# Patient Record
Sex: MALE | HISPANIC OR LATINO | Employment: FULL TIME | URBAN - METROPOLITAN AREA
[De-identification: names, ages, dates, MRNs, and addresses within clinical notes are randomized per-mention and may not be internally consistent; named-entity substitution may affect disease eponyms.]

---

## 2017-01-20 ENCOUNTER — OFFICE VISIT (OUTPATIENT)
Dept: PHYSICAL MEDICINE AND REHAB | Facility: MEDICAL CENTER | Age: 32
End: 2017-01-20
Payer: COMMERCIAL

## 2017-01-20 VITALS
WEIGHT: 244 LBS | OXYGEN SATURATION: 98 % | HEIGHT: 65 IN | TEMPERATURE: 97.2 F | BODY MASS INDEX: 40.65 KG/M2 | SYSTOLIC BLOOD PRESSURE: 118 MMHG | HEART RATE: 69 BPM | DIASTOLIC BLOOD PRESSURE: 80 MMHG

## 2017-01-20 DIAGNOSIS — M79.641 PAIN IN BOTH HANDS: ICD-10-CM

## 2017-01-20 DIAGNOSIS — M25.532 PAIN IN BOTH WRISTS: ICD-10-CM

## 2017-01-20 DIAGNOSIS — M25.50 ARTHRALGIA, UNSPECIFIED JOINT: ICD-10-CM

## 2017-01-20 DIAGNOSIS — Z79.899 CONTROLLED SUBSTANCE AGREEMENT SIGNED: ICD-10-CM

## 2017-01-20 DIAGNOSIS — M62.838 MUSCLE SPASM: ICD-10-CM

## 2017-01-20 DIAGNOSIS — M06.9 RHEUMATOID ARTHRITIS, INVOLVING UNSPECIFIED SITE, UNSPECIFIED RHEUMATOID FACTOR PRESENCE: ICD-10-CM

## 2017-01-20 DIAGNOSIS — M79.642 PAIN IN BOTH HANDS: ICD-10-CM

## 2017-01-20 DIAGNOSIS — M25.531 PAIN IN BOTH WRISTS: ICD-10-CM

## 2017-01-20 DIAGNOSIS — M79.2 NERVE PAIN: ICD-10-CM

## 2017-01-20 DIAGNOSIS — G56.00 CARPAL TUNNEL SYNDROME, UNSPECIFIED LATERALITY: ICD-10-CM

## 2017-01-20 DIAGNOSIS — M54.9 SPINAL PAIN: ICD-10-CM

## 2017-01-20 DIAGNOSIS — G56.03 BILATERAL CARPAL TUNNEL SYNDROME: ICD-10-CM

## 2017-01-20 DIAGNOSIS — R79.89 LOW TESTOSTERONE LEVEL IN MALE: ICD-10-CM

## 2017-01-20 DIAGNOSIS — M25.552 LEFT HIP PAIN: ICD-10-CM

## 2017-01-20 PROCEDURE — 99214 OFFICE O/P EST MOD 30 MIN: CPT | Performed by: PHYSICAL MEDICINE & REHABILITATION

## 2017-01-20 RX ORDER — MORPHINE SULFATE 15 MG/1
TABLET ORAL
Qty: 120 TAB | Refills: 0 | Status: SHIPPED | OUTPATIENT
Start: 2017-01-20 | End: 2017-01-20 | Stop reason: SDUPTHER

## 2017-01-20 RX ORDER — DIAZEPAM 5 MG/1
TABLET ORAL
Refills: 0 | COMMUNITY
Start: 2016-12-21 | End: 2017-01-20

## 2017-01-20 RX ORDER — GABAPENTIN 300 MG/1
CAPSULE ORAL
Qty: 180 CAP | Refills: 5 | Status: SHIPPED | OUTPATIENT
Start: 2017-01-20

## 2017-01-20 RX ORDER — MORPHINE SULFATE 15 MG/1
TABLET ORAL
Qty: 120 TAB | Refills: 0 | Status: SHIPPED | OUTPATIENT
Start: 2017-01-20 | End: 2021-04-28

## 2017-01-20 RX ORDER — CYCLOBENZAPRINE HCL 10 MG
TABLET ORAL
Qty: 90 TAB | Refills: 5 | Status: SHIPPED | OUTPATIENT
Start: 2017-01-20 | End: 2018-07-19 | Stop reason: SDUPTHER

## 2017-01-20 NOTE — MR AVS SNAPSHOT
"Cooper Beatty   2017 8:20 AM   Office Visit   MRN: [id number]    Department:  [facility]   Dept Phone:  [phone]    Description:  Male : 1985   Provider:  Victor Hugo Ma M.D.           Reason for Visit     Follow-Up           Allergies as of 2017     Allergen Noted Reactions    Nkda [No Known Drug Allergy] 2008         You were diagnosed with     Controlled substance agreement signed   [063172]       Spinal pain   [788636]       Rheumatoid arthritis, involving unspecified site, unspecified rheumatoid factor presence (CMS-[facility])   [3496775]       Nerve pain   [615826]       Muscle spasm   [208925]       Low testosterone level in male   [8772011]       Arthralgia, unspecified joint   [7221357]       Carpal tunnel syndrome, unspecified laterality   [399304]       Pain in both hands   [5057257]       Bilateral carpal tunnel syndrome   [941320]       Pain in both wrists   [693134]       Left hip pain   [579949]         Vital Signs     Blood Pressure Pulse Temperature Height Weight Body Mass Index    118/80 mmHg 69 36.2 °C (97.2 °F) 1.651 m (5' 5\") 110.678 kg (244 lb) 40.60 kg/m2    Oxygen Saturation Smoking Status                98% Never Smoker           Basic Information     Date Of Birth Sex Race Ethnicity Preferred Language    1985 Male  or   Origin (Irish,Belarusian,Serbian,Mosotho, etc) English      Your appointments     2017  9:15 AM   Follow Up Visit with Victor Hugo Ma M.D.   [facility] PHYSIAT[facility] (--)    [address] [id number] [phone]           You will be receiving a confirmation call a few days before your appointment from our automated call confirmation system.              Problem List              ICD-10-CM Priority Class Noted - Resolved    Rheumatoid arthritis (CMS-[facility]) M06.9   11/3/2009 - Present    Wrist pain M25.539   10/30/2013 - Present    Hand pain M79.643   10/30/2013 - Present "    Carpal tunnel syndrome G56.00   10/30/2013 - Present    Hip pain M25.559   10/30/2013 - Present    Shoulder pain M25.519   10/30/2013 - Present    Low back pain M54.5   10/30/2013 - Present    Chronic use of opiate drugs therapeutic purposes Z79.899   5/1/2015 - Present    Elevated liver enzymes R74.8   5/1/2015 - Present    Positive QuantiFERON-TB Gold test R76.12   5/1/2015 - Present    Vitamin D deficiency E55.9   5/1/2015 - Present    Controlled substance agreement signed Z79.899   2/18/2016 - Present      Health Maintenance        Date Due Completion Dates    IMM DTaP/Tdap/Td Vaccine (1 - Tdap) 5/24/2006 ---    IMM INFLUENZA (1) 9/1/2016 ---            Current Immunizations     No immunizations on file.      Below and/or attached are the medications your provider expects you to take. Review all of your home medications and newly ordered medications with your provider and/or pharmacist. Follow medication instructions as directed by your provider and/or pharmacist. Please keep your medication list with you and share with your provider. Update the information when medications are discontinued, doses are changed, or new medications (including over-the-counter products) are added; and carry medication information at all times in the event of emergency situations     Allergies:  NKDA - (reactions not documented)               Medications  Valid as of: January 20, 2017 -  9:41 AM    Generic Name Brand Name Tablet Size Instructions for use    Cyclobenzaprine HCl (Tab) FLEXERIL 10 MG Take 1/2  to 1 tablet by mouth three per day as needed for muscle spasm.        Gabapentin (Cap) NEURONTIN 300 MG Take 1 to 2 tablets by mouth every 8 hours as needed for nerve pain  Indications: Nerve Pain        Morphine Sulfate (Tab) MS IR 15 MG Take 1 tablet by mouth every 6  hours as needed for pain.        .                 Medicines prescribed today were sent to:     [facility] [place] [facility] [address]    [address]  [place] [address]    Phone: [phone] Fax: [phone]    Open 24 Hours?: No      Medication refill instructions:       If your prescription bottle indicates you have medication refills left, it is not necessary to call your provider’s office. Please contact your pharmacy and they will refill your medication.    If your prescription bottle indicates you do not have any refills left, you may request refills at any time through one of the following ways: The online [employer] [facility] system (except Urgent Care), by calling your provider’s office, or by asking your pharmacy to contact your provider’s office with a refill request. Medication refills are processed only during regular business hours and may not be available until the next business day. Your provider may request additional information or to have a follow-up visit with you prior to refilling your medication.   *Please Note: Medication refills are assigned a new Rx number when refilled electronically. Your pharmacy may indicate that no refills were authorized even though a new prescription for the same medication is available at the pharmacy. Please request the medicine by name with the pharmacy before contacting your provider for a refill.        Your To Do List     Future Labs/Procedures Complete By Expires    DS-BONE DENSITY STUDY (DEXA)  As directed 1/20/2018    DX-HIP-COMPLETE - UNILATERAL 2+ LEFT  As directed 1/20/2018      Referral     A referral request has been sent to our patient care coordination department. Please allow 3-5 business days for us to process this request and contact you either by phone or mail. If you do not hear from us by the 5th business day, please call us at [phone].           [employer] Access Code: [email or web address]  Expires: 1/30/2017  8:13 AM    Tu [employer]et  A secure, online tool to manage your health information     [employer]’s Tu Closet Mi Closet® is a secure, online tool that connects you to your personalized health information  from the privacy of your home -- day or night - making it very easy for you to manage your healthcare. Once the activation process is completed, you can even access your medical information using the Case Commons jasen, which is available for free in the Apple Jasen store or Google Play store.     Case Commons provides the following levels of access (as shown below):   My Chart Features   [facility] Doctor [facility]  Specialists [facility]  Urgent  Care Non-[facility]  Primary Care  Doctor   Email your healthcare team securely and privately 24/7 X X X    Manage appointments: schedule your next appointment; view details of past/upcoming appointments X      Request prescription refills. X      View recent personal medical records, including lab and immunizations X X X X   View health record, including health history, allergies, medications X X X X   Read reports about your outpatient visits, procedures, consult and ER notes X X X X   See your discharge summary, which is a recap of your hospital and/or ER visit that includes your diagnosis, lab results, and care plan. X X       How to register for Case Commons:  1. Go to  [email or web address].  2. Click on the Sign Up Now box, which takes you to the New Member Sign Up page. You will need to provide the following information:  a. Enter your Case Commons Access Code exactly as it appears at the top of this page. (You will not need to use this code after you’ve completed the sign-up process. If you do not sign up before the expiration date, you must request a new code.)   b. Enter your date of birth.   c. Enter your home email address.   d. Click Submit, and follow the next screen’s instructions.  3. Create a Case Commons ID. This will be your Case Commons login ID and cannot be changed, so think of one that is secure and easy to remember.  4. Create a Case Commons password. You can change your password at any time.  5. Enter your Password Reset Question and Answer. This can be used at a later time if you  forget your password.   6. Enter your e-mail address. This allows you to receive e-mail notifications when new information is available in [employer].  7. Click Sign Up. You can now view your health information.    For assistance activating your [employer] account, call [phone]
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
## 2017-11-22 ENCOUNTER — NON-PROVIDER VISIT (OUTPATIENT)
Dept: OCCUPATIONAL MEDICINE | Facility: CLINIC | Age: 32
End: 2017-11-22

## 2017-11-22 DIAGNOSIS — Z02.1 DRUG TESTING, PRE-EMPLOYMENT: ICD-10-CM

## 2017-11-22 DIAGNOSIS — Z02.1 PRE-EMPLOYMENT DRUG SCREENING: ICD-10-CM

## 2017-11-22 LAB
AMP AMPHETAMINE: NORMAL
COC COCAINE: NORMAL
INT CON NEG: NORMAL
INT CON POS: NORMAL
MET METHAMPHETAMINES: NORMAL
OPI OPIATES: NORMAL
PCP PHENCYCLIDINE: NORMAL
POC DRUG COMMENT 753798-OCCUPATIONAL HEALTH: NEGATIVE
THC: NORMAL

## 2017-11-22 PROCEDURE — 8911 PR MRO FEE: Performed by: INTERNAL MEDICINE

## 2017-11-22 PROCEDURE — 80305 DRUG TEST PRSMV DIR OPT OBS: CPT | Performed by: PREVENTIVE MEDICINE

## 2018-02-09 ENCOUNTER — NON-PROVIDER VISIT (OUTPATIENT)
Dept: URGENT CARE | Facility: CLINIC | Age: 33
End: 2018-02-09

## 2018-02-09 DIAGNOSIS — Z02.1 PRE-EMPLOYMENT DRUG SCREENING: ICD-10-CM

## 2018-02-09 LAB
AMP AMPHETAMINE: NORMAL
COC COCAINE: NORMAL
INT CON NEG: NORMAL
INT CON POS: NORMAL
MET METHAMPHETAMINES: NORMAL
OPI OPIATES: NORMAL
PCP PHENCYCLIDINE: NORMAL
POC DRUG COMMENT 753798-OCCUPATIONAL HEALTH: NORMAL
THC: NORMAL

## 2018-02-09 PROCEDURE — 80305 DRUG TEST PRSMV DIR OPT OBS: CPT | Performed by: NURSE PRACTITIONER

## 2018-02-14 ENCOUNTER — NON-PROVIDER VISIT (OUTPATIENT)
Dept: OCCUPATIONAL MEDICINE | Facility: CLINIC | Age: 33
End: 2018-02-14

## 2018-02-14 DIAGNOSIS — Z02.83 ENCOUNTER FOR DRUG SCREENING: ICD-10-CM

## 2018-02-14 PROCEDURE — 8911 PR MRO FEE: Performed by: INTERNAL MEDICINE

## 2018-07-02 ENCOUNTER — HOSPITAL ENCOUNTER (OUTPATIENT)
Dept: RADIOLOGY | Facility: MEDICAL CENTER | Age: 33
End: 2018-07-02
Attending: INTERNAL MEDICINE
Payer: COMMERCIAL

## 2018-07-02 DIAGNOSIS — M06.9 RHEUMATOID ARTHRITIS OF WRIST, UNSPECIFIED LATERALITY, UNSPECIFIED RHEUMATOID FACTOR PRESENCE: ICD-10-CM

## 2018-07-02 PROCEDURE — 73110 X-RAY EXAM OF WRIST: CPT | Mod: LT

## 2018-07-19 ENCOUNTER — OFFICE VISIT (OUTPATIENT)
Dept: MEDICAL GROUP | Age: 33
End: 2018-07-19
Payer: COMMERCIAL

## 2018-07-19 ENCOUNTER — HOSPITAL ENCOUNTER (OUTPATIENT)
Dept: RADIOLOGY | Facility: MEDICAL CENTER | Age: 33
End: 2018-07-19
Attending: PHYSICIAN ASSISTANT
Payer: COMMERCIAL

## 2018-07-19 ENCOUNTER — HOSPITAL ENCOUNTER (OUTPATIENT)
Dept: LAB | Facility: MEDICAL CENTER | Age: 33
End: 2018-07-19
Attending: PHYSICIAN ASSISTANT
Payer: COMMERCIAL

## 2018-07-19 VITALS
WEIGHT: 236 LBS | HEART RATE: 67 BPM | BODY MASS INDEX: 41.82 KG/M2 | TEMPERATURE: 97.5 F | OXYGEN SATURATION: 97 % | HEIGHT: 63 IN | SYSTOLIC BLOOD PRESSURE: 98 MMHG | DIASTOLIC BLOOD PRESSURE: 64 MMHG

## 2018-07-19 DIAGNOSIS — R74.8 ELEVATED LIVER ENZYMES: ICD-10-CM

## 2018-07-19 DIAGNOSIS — Z23 NEED FOR VACCINATION: ICD-10-CM
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
## 2018-07-19 DIAGNOSIS — Z79.891 CHRONIC USE OF OPIATE DRUGS THERAPEUTIC PURPOSES: ICD-10-CM

## 2018-07-19 DIAGNOSIS — M06.9 RHEUMATOID ARTHRITIS INVOLVING MULTIPLE SITES, UNSPECIFIED RHEUMATOID FACTOR PRESENCE: ICD-10-CM

## 2018-07-19 DIAGNOSIS — M62.838 MUSCLE SPASM: ICD-10-CM

## 2018-07-19 DIAGNOSIS — E66.01 MORBID OBESITY WITH BMI OF 40.0-44.9, ADULT (HCC): ICD-10-CM

## 2018-07-19 LAB
ALBUMIN SERPL BCP-MCNC: 4.2 G/DL (ref 3.2–4.9)
ALBUMIN/GLOB SERPL: 1.6 G/DL
ALP SERPL-CCNC: 70 U/L (ref 30–99)
ALT SERPL-CCNC: 50 U/L (ref 2–50)
ANION GAP SERPL CALC-SCNC: 6 MMOL/L (ref 0–11.9)
AST SERPL-CCNC: 25 U/L (ref 12–45)
BASOPHILS # BLD AUTO: 0.6 % (ref 0–1.8)
BASOPHILS # BLD: 0.05 K/UL (ref 0–0.12)
BILIRUB SERPL-MCNC: 0.6 MG/DL (ref 0.1–1.5)
BUN SERPL-MCNC: 14 MG/DL (ref 8–22)
CALCIUM SERPL-MCNC: 9.7 MG/DL (ref 8.5–10.5)
CHLORIDE SERPL-SCNC: 104 MMOL/L (ref 96–112)
CHOLEST SERPL-MCNC: 123 MG/DL (ref 100–199)
CO2 SERPL-SCNC: 29 MMOL/L (ref 20–33)
CREAT SERPL-MCNC: 0.81 MG/DL (ref 0.5–1.4)
EOSINOPHIL # BLD AUTO: 0.15 K/UL (ref 0–0.51)
EOSINOPHIL NFR BLD: 1.9 % (ref 0–6.9)
ERYTHROCYTE [DISTWIDTH] IN BLOOD BY AUTOMATED COUNT: 39.8 FL (ref 35.9–50)
GLOBULIN SER CALC-MCNC: 2.6 G/DL (ref 1.9–3.5)
GLUCOSE SERPL-MCNC: 92 MG/DL (ref 65–99)
HCT VFR BLD AUTO: 46.4 % (ref 42–52)
HDLC SERPL-MCNC: 51 MG/DL
HGB BLD-MCNC: 15.5 G/DL (ref 14–18)
IMM GRANULOCYTES # BLD AUTO: 0.01 K/UL (ref 0–0.11)
IMM GRANULOCYTES NFR BLD AUTO: 0.1 % (ref 0–0.9)
LDLC SERPL CALC-MCNC: 57 MG/DL
LYMPHOCYTES # BLD AUTO: 2.54 K/UL (ref 1–4.8)
LYMPHOCYTES NFR BLD: 32.7 % (ref 22–41)
MCH RBC QN AUTO: 27.8 PG (ref 27–33)
MCHC RBC AUTO-ENTMCNC: 33.4 G/DL (ref 33.7–35.3)
MCV RBC AUTO: 83.3 FL (ref 81.4–97.8)
MONOCYTES # BLD AUTO: 0.66 K/UL (ref 0–0.85)
MONOCYTES NFR BLD AUTO: 8.5 % (ref 0–13.4)
NEUTROPHILS # BLD AUTO: 4.35 K/UL (ref 1.82–7.42)
NEUTROPHILS NFR BLD: 56.2 % (ref 44–72)
NRBC # BLD AUTO: 0 K/UL
NRBC BLD-RTO: 0 /100 WBC
PLATELET # BLD AUTO: 214 K/UL (ref 164–446)
PMV BLD AUTO: 9.9 FL (ref 9–12.9)
POTASSIUM SERPL-SCNC: 4 MMOL/L (ref 3.6–5.5)
PROT SERPL-MCNC: 6.8 G/DL (ref 6–8.2)
RBC # BLD AUTO: 5.57 M/UL (ref 4.7–6.1)
SODIUM SERPL-SCNC: 139 MMOL/L (ref 135–145)
TRIGL SERPL-MCNC: 73 MG/DL (ref 0–149)
WBC # BLD AUTO: 7.8 K/UL (ref 4.8–10.8)

## 2018-07-19 PROCEDURE — 85025 COMPLETE CBC W/AUTO DIFF WBC: CPT

## 2018-07-19 PROCEDURE — 99204 OFFICE O/P NEW MOD 45 MIN: CPT | Mod: 25 | Performed by: PHYSICIAN ASSISTANT

## 2018-07-19 PROCEDURE — 90471 IMMUNIZATION ADMIN: CPT | Performed by: PHYSICIAN ASSISTANT

## 2018-07-19 PROCEDURE — 72100 X-RAY EXAM L-S SPINE 2/3 VWS: CPT

## 2018-07-19 PROCEDURE — 90715 TDAP VACCINE 7 YRS/> IM: CPT | Performed by: PHYSICIAN ASSISTANT

## 2018-07-19 PROCEDURE — 80053 COMPREHEN METABOLIC PANEL: CPT

## 2018-07-19 PROCEDURE — 80061 LIPID PANEL: CPT

## 2018-07-19 PROCEDURE — 36415 COLL VENOUS BLD VENIPUNCTURE: CPT

## 2018-07-19 RX ORDER — HYDROCODONE BITARTRATE AND ACETAMINOPHEN 10; 325 MG/1; MG/1
1-2 TABLET ORAL EVERY 6 HOURS PRN
COMMUNITY
End: 2021-04-28

## 2018-07-19 RX ORDER — CYCLOBENZAPRINE HCL 5 MG
5 TABLET ORAL
Qty: 30 TAB | Refills: 0 | Status: SHIPPED
Start: 2018-07-19 | End: 2020-05-19

## 2018-07-19 ASSESSMENT — PATIENT HEALTH QUESTIONNAIRE - PHQ9: CLINICAL INTERPRETATION OF PHQ2 SCORE: 0

## 2018-07-19 NOTE — ASSESSMENT & PLAN NOTE
Patient sees Dr. Medrano at [facility] for controlled substances and ongoing pain procedures.  He is not requesting any additional medications from me today.  ORT, UDS, and CS agreement are not indicated.
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
## 2018-07-19 NOTE — ASSESSMENT & PLAN NOTE
This is an ongoing problem. The patient reports that he does not exercise regularly due to working 60-70 hours a week. He is interested in losing weight, but not sure how to accomplish this. His normal diet is as follows:    Breakfast: coffee and bagel  Lunch: subway or oatmeal  Dinner: small cheese burger, other small fast food.    He is frustrated because he does not eat a lot. He does not cook or eat at home because he works so much. His wife cooks, but he is rarely home for these meals. He is not a picky eater, and is willing to try most things. He is interested in learning more about nutrition, and would like to consult with our Health Improvement Program for this. In the meantime, he will work on integrating more vegetables and protein into his diet.      Takes prednisone for RA flares yearly, which caused most of his initial weight gain and has not been able to get the weight back off.

## 2018-07-19 NOTE — LETTER
[facility]  Beverly Blair P.A.-C.  [address] [place] [address]  Fax: [phone]   Authorization for Release/Disclosure of   Protected Health Information   Name: COOPER BERNAL : 1985 SSN: [id number]   Address: [address] [place] [address] Phone:    [phone] (home)    I authorize the entity listed below to release/disclose the PHI below to:   [facility]/Beverly Blair P.A.-C. and Beverly Blair P.A.-C.   Provider or Entity Name:  [facility]   Address   City, State, [facility] [place]  [place] Phone:      Fax:     Reason for request: continuity of care   Information to be released:    [  ] LAST COLONOSCOPY,  including any PATH REPORT and follow-up  [  ] LAST FIT/COLOGUARD RESULT [  ] LAST DEXA  [  ] LAST MAMMOGRAM  [  ] LAST PAP  [  ] LAST LABS [  ] RETINA EXAM REPORT  [  ] IMMUNIZATION RECORDS  [x  ] Release all info      [  ] Check here and initial the line next to each item to release ALL health information INCLUDING  _____ Care and treatment for drug and / or alcohol abuse  _____ HIV testing, infection status, or AIDS  _____ Genetic Testing    DATES OF SERVICE OR TIME PERIOD TO BE DISCLOSED: _____________  I understand and acknowledge that:  * This Authorization may be revoked at any time by you in writing, except if your health information has already been used or disclosed.  * Your health information that will be used or disclosed as a result of you signing this authorization could be re-disclosed by the recipient. If this occurs, your re-disclosed health information may no longer be protected by State or Federal laws.  * You may refuse to sign this Authorization. Your refusal will not affect your ability to obtain treatment.  * This Authorization becomes effective upon signing and will  on (date) __________.      If no date is indicated, this Authorization will  one (1) year from the signature date.    Name: Cooper Bernal    Signature:   Date:     2018       PLEASE FAX  REQUESTED RECORDS BACK TO: [phone]

## 2018-07-19 NOTE — ASSESSMENT & PLAN NOTE
This is a chronic problem.  The patient reports that he was diagnosed at age 5.  As a result of this condition, he has had 2 hip replacements, 4 back surgeries, and continues to struggle with carpal tunnel syndrome. Used to see [facility], but lost his insurance and has not been seen by rheumatologist in approximately 3 years.  He is requesting a new referral.  Currently, he is establishing pain management at [facility], and has hip ablations q 6 months with Dr. Medrano. Scheduled for 7/24/2018 for next procedure.

## 2018-07-19 NOTE — LETTER
[facility]  Beverly Blair P.A.-C.  [address] [facility] [place] [address]  Fax: [phone]   Authorization for Release/Disclosure of   Protected Health Information   Name: COOPER BERNAL : 1985 SSN: [id number]   Address: [address] [place] [address] [place] [address] Phone:    [phone] (home)    I authorize the entity listed below to release/disclose the PHI below to:   [facility]/Beverly Blair P.A.-C. and Beverly Blair P.A.-C.   Provider or Entity Name:  Jul   Address   City, State, Zip   Phone:      Fax:     Reason for request: continuity of care   Information to be released:    [  ] LAST COLONOSCOPY,  including any PATH REPORT and follow-up  [  ] LAST FIT/COLOGUARD RESULT [  ] LAST DEXA  [  ] LAST MAMMOGRAM  [  ] LAST PAP  [  ] LAST LABS [  ] RETINA EXAM REPORT  [  ] IMMUNIZATION RECORDS  [  ] Release all info      [  ] Check here and initial the line next to each item to release ALL health information INCLUDING  _____ Care and treatment for drug and / or alcohol abuse  _____ HIV testing, infection status, or AIDS  _____ Genetic Testing    DATES OF SERVICE OR TIME PERIOD TO BE DISCLOSED: _____________  I understand and acknowledge that:  * This Authorization may be revoked at any time by you in writing, except if your health information has already been used or disclosed.  * Your health information that will be used or disclosed as a result of you signing this authorization could be re-disclosed by the recipient. If this occurs, your re-disclosed health information may no longer be protected by State or Federal laws.  * You may refuse to sign this Authorization. Your refusal will not affect your ability to obtain treatment.  * This Authorization becomes effective upon signing and will  on (date) __________.      If no date is indicated, this Authorization will  one (1) year from the signature date.    Name: Cooper Bernal    Signature:   Date:     2018       PLEASE FAX REQUESTED  RECORDS BACK TO: [phone]

## 2018-07-19 NOTE — ASSESSMENT & PLAN NOTE
Historical diagnosis.  This is not been investigated in several years.  Labs will be repeated today.

## 2018-08-09 ENCOUNTER — OFFICE VISIT (OUTPATIENT)
Dept: MEDICAL GROUP | Age: 33
End: 2018-08-09
Payer: COMMERCIAL

## 2018-08-09 ENCOUNTER — APPOINTMENT (OUTPATIENT)
Dept: MEDICAL GROUP | Age: 33
End: 2018-08-09
Payer: COMMERCIAL

## 2018-08-09 VITALS
HEART RATE: 67 BPM | BODY MASS INDEX: 42.56 KG/M2 | SYSTOLIC BLOOD PRESSURE: 122 MMHG | WEIGHT: 240.2 LBS | TEMPERATURE: 97.7 F | RESPIRATION RATE: 12 BRPM | DIASTOLIC BLOOD PRESSURE: 88 MMHG | HEIGHT: 63 IN | OXYGEN SATURATION: 95 %

## 2018-08-09 DIAGNOSIS — E66.01 MORBID OBESITY WITH BMI OF 40.0-44.9, ADULT (HCC): ICD-10-CM
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
## 2018-08-09 DIAGNOSIS — R53.82 CHRONIC FATIGUE: Primary | ICD-10-CM

## 2018-08-09 PROCEDURE — 99214 OFFICE O/P EST MOD 30 MIN: CPT | Performed by: FAMILY MEDICINE
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
## 2018-08-29 ENCOUNTER — TELEPHONE (OUTPATIENT)
Dept: MEDICAL GROUP | Age: 33
End: 2018-08-29

## 2018-08-29 NOTE — TELEPHONE ENCOUNTER
Fax Received From: [facility]- Regarding Overnight Pulse Ox    Message: [facility] has been unable to contact patient to schedule test to be done. - Form scanned to Media.  Called spoke with patient in regards to overnight pulse ox test that Beverly ordered. Informed patient company has been unable to contact patient, to schedule test. Patient was provided with phone numbers to call and schedule test. [facility]: [phone] or [facility] [phone]

## 2018-10-03 ENCOUNTER — HOSPITAL ENCOUNTER (OUTPATIENT)
Dept: RADIOLOGY | Facility: MEDICAL CENTER | Age: 33
End: 2018-10-03
Attending: INTERNAL MEDICINE
Payer: COMMERCIAL

## 2018-10-03 ENCOUNTER — HOSPITAL ENCOUNTER (OUTPATIENT)
Dept: LAB | Facility: MEDICAL CENTER | Age: 33
End: 2018-10-03
Attending: INTERNAL MEDICINE
Payer: COMMERCIAL

## 2018-10-03 DIAGNOSIS — M06.4 INFLAMMATORY POLYARTHROPATHY (HCC): ICD-10-CM

## 2018-10-03 LAB
CRP SERPL HS-MCNC: 0.22 MG/DL (ref 0–0.75)
ERYTHROCYTE [SEDIMENTATION RATE] IN BLOOD BY WESTERGREN METHOD: 1 MM/HOUR (ref 0–15)
RHEUMATOID FACT SER IA-ACNC: <10 IU/ML (ref 0–14)

## 2018-10-03 PROCEDURE — 73560 X-RAY EXAM OF KNEE 1 OR 2: CPT | Mod: RT

## 2018-10-03 PROCEDURE — 73120 X-RAY EXAM OF HAND: CPT | Mod: RT

## 2018-10-03 PROCEDURE — 85652 RBC SED RATE AUTOMATED: CPT

## 2018-10-03 PROCEDURE — 86803 HEPATITIS C AB TEST: CPT

## 2018-10-03 PROCEDURE — 77077 JOINT SURVEY SINGLE VIEW: CPT

## 2018-10-03 PROCEDURE — 36415 COLL VENOUS BLD VENIPUNCTURE: CPT

## 2018-10-03 PROCEDURE — 86038 ANTINUCLEAR ANTIBODIES: CPT

## 2018-10-03 PROCEDURE — 87340 HEPATITIS B SURFACE AG IA: CPT

## 2018-10-03 PROCEDURE — 73521 X-RAY EXAM HIPS BI 2 VIEWS: CPT

## 2018-10-03 PROCEDURE — 73565 X-RAY EXAM OF KNEES: CPT

## 2018-10-03 PROCEDURE — 86140 C-REACTIVE PROTEIN: CPT

## 2018-10-03 PROCEDURE — 73620 X-RAY EXAM OF FOOT: CPT | Mod: LT

## 2018-10-03 PROCEDURE — 86480 TB TEST CELL IMMUN MEASURE: CPT

## 2018-10-03 PROCEDURE — 73620 X-RAY EXAM OF FOOT: CPT | Mod: RT

## 2018-10-03 PROCEDURE — 86431 RHEUMATOID FACTOR QUANT: CPT

## 2018-10-03 PROCEDURE — 73120 X-RAY EXAM OF HAND: CPT | Mod: LT

## 2018-10-03 PROCEDURE — 73560 X-RAY EXAM OF KNEE 1 OR 2: CPT | Mod: LT

## 2018-10-03 PROCEDURE — 86200 CCP ANTIBODY: CPT

## 2018-10-04 LAB
HBV SURFACE AG SER QL: NEGATIVE
HCV AB SER QL: NEGATIVE

## 2018-10-05 LAB
M TB TUBERC IFN-G BLD QL: POSITIVE
M TB TUBERC IFN-G/MITOGEN IGNF BLD: 0.39
M TB TUBERC IGNF/MITOGEN IGNF CONTROL: 49.59 [IU]/ML
MITOGEN IGNF BCKGRD COR BLD-ACNC: 0.08 [IU]/ML
NUCLEAR IGG SER QL IA: NORMAL

## 2018-10-16 LAB — TEST NAME 95000: NORMAL

## 2020-05-04 ENCOUNTER — HOSPITAL ENCOUNTER (OUTPATIENT)
Dept: RADIOLOGY | Facility: MEDICAL CENTER | Age: 35
End: 2020-05-04
Attending: INTERNAL MEDICINE
Payer: COMMERCIAL

## 2020-05-04 ENCOUNTER — HOSPITAL ENCOUNTER (OUTPATIENT)
Dept: LAB | Facility: MEDICAL CENTER | Age: 35
End: 2020-05-04
Attending: INTERNAL MEDICINE
Payer: COMMERCIAL

## 2020-05-04 DIAGNOSIS — M06.09 RHEUMATOID ARTHRITIS OF MULTIPLE SITES WITHOUT RHEUMATOID FACTOR (HCC): ICD-10-CM

## 2020-05-04 DIAGNOSIS — M25.60 JOINT STIFFNESS: ICD-10-CM

## 2020-05-04 LAB
ALBUMIN SERPL BCP-MCNC: 4.3 G/DL (ref 3.2–4.9)
ALP SERPL-CCNC: 103 U/L (ref 30–99)
ALT SERPL-CCNC: 34 U/L (ref 2–50)
AST SERPL-CCNC: 18 U/L (ref 12–45)
BASOPHILS # BLD AUTO: 0.2 % (ref 0–1.8)
BASOPHILS # BLD: 0.02 K/UL (ref 0–0.12)
BILIRUB CONJ SERPL-MCNC: <0.2 MG/DL (ref 0.1–0.5)
BILIRUB INDIRECT SERPL-MCNC: ABNORMAL MG/DL (ref 0–1)
BILIRUB SERPL-MCNC: 0.4 MG/DL (ref 0.1–1.5)
CREAT SERPL-MCNC: 0.75 MG/DL (ref 0.5–1.4)
CRP SERPL HS-MCNC: 0.21 MG/DL (ref 0–0.75)
EOSINOPHIL # BLD AUTO: 0.16 K/UL (ref 0–0.51)
EOSINOPHIL NFR BLD: 1.8 % (ref 0–6.9)
ERYTHROCYTE [DISTWIDTH] IN BLOOD BY AUTOMATED COUNT: 38.4 FL (ref 35.9–50)
HAV IGM SERPL QL IA: NORMAL
HBV CORE IGM SER QL: NORMAL
HBV SURFACE AG SER QL: NORMAL
HCT VFR BLD AUTO: 47.7 % (ref 42–52)
HCV AB SER QL: NORMAL
HGB BLD-MCNC: 16.3 G/DL (ref 14–18)
IMM GRANULOCYTES # BLD AUTO: 0.02 K/UL (ref 0–0.11)
IMM GRANULOCYTES NFR BLD AUTO: 0.2 % (ref 0–0.9)
LYMPHOCYTES # BLD AUTO: 2.5 K/UL (ref 1–4.8)
LYMPHOCYTES NFR BLD: 28.8 % (ref 22–41)
MCH RBC QN AUTO: 27.8 PG (ref 27–33)
MCHC RBC AUTO-ENTMCNC: 34.2 G/DL (ref 33.7–35.3)
MCV RBC AUTO: 81.4 FL (ref 81.4–97.8)
MONOCYTES # BLD AUTO: 0.58 K/UL (ref 0–0.85)
MONOCYTES NFR BLD AUTO: 6.7 % (ref 0–13.4)
NEUTROPHILS # BLD AUTO: 5.41 K/UL (ref 1.82–7.42)
NEUTROPHILS NFR BLD: 62.3 % (ref 44–72)
NRBC # BLD AUTO: 0 K/UL
NRBC BLD-RTO: 0 /100 WBC
PLATELET # BLD AUTO: 212 K/UL (ref 164–446)
PMV BLD AUTO: 10 FL (ref 9–12.9)
PROT SERPL-MCNC: 7.2 G/DL (ref 6–8.2)
RBC # BLD AUTO: 5.86 M/UL (ref 4.7–6.1)
T4 FREE SERPL-MCNC: 1.75 NG/DL (ref 0.93–1.7)
TSH SERPL DL<=0.005 MIU/L-ACNC: 1.47 UIU/ML (ref 0.38–5.33)
WBC # BLD AUTO: 8.7 K/UL (ref 4.8–10.8)

## 2020-05-04 PROCEDURE — 82565 ASSAY OF CREATININE: CPT

## 2020-05-04 PROCEDURE — 77077 JOINT SURVEY SINGLE VIEW: CPT

## 2020-05-04 PROCEDURE — 80076 HEPATIC FUNCTION PANEL: CPT

## 2020-05-04 PROCEDURE — 86140 C-REACTIVE PROTEIN: CPT

## 2020-05-04 PROCEDURE — 36415 COLL VENOUS BLD VENIPUNCTURE: CPT

## 2020-05-04 PROCEDURE — 85652 RBC SED RATE AUTOMATED: CPT

## 2020-05-04 PROCEDURE — 84443 ASSAY THYROID STIM HORMONE: CPT

## 2020-05-04 PROCEDURE — 84439 ASSAY OF FREE THYROXINE: CPT

## 2020-05-04 PROCEDURE — 86480 TB TEST CELL IMMUN MEASURE: CPT

## 2020-05-04 PROCEDURE — 80074 ACUTE HEPATITIS PANEL: CPT

## 2020-05-04 PROCEDURE — 85025 COMPLETE CBC W/AUTO DIFF WBC: CPT

## 2020-05-05 LAB — ERYTHROCYTE [SEDIMENTATION RATE] IN BLOOD BY WESTERGREN METHOD: 1 MM/HOUR (ref 0–15)

## 2020-05-06 LAB
GAMMA INTERFERON BACKGROUND BLD IA-ACNC: 0.04 IU/ML
M TB IFN-G BLD-IMP: NEGATIVE
M TB IFN-G CD4+ BCKGRND COR BLD-ACNC: 0.08 IU/ML
MITOGEN IGNF BCKGRD COR BLD-ACNC: >10 IU/ML
QFT TB2 - NIL TBQ2: 0.12 IU/ML

## 2020-05-19 ENCOUNTER — TELEMEDICINE (OUTPATIENT)
Dept: MEDICAL GROUP | Facility: PHYSICIAN GROUP | Age: 35
End: 2020-05-19
Payer: COMMERCIAL

## 2020-05-19 VITALS — WEIGHT: 245 LBS | HEIGHT: 65 IN | BODY MASS INDEX: 40.82 KG/M2 | RESPIRATION RATE: 20 BRPM | HEART RATE: 72 BPM

## 2020-05-19 DIAGNOSIS — R79.89 ELEVATED SERUM FREE T4 LEVEL: ICD-10-CM

## 2020-05-19 DIAGNOSIS — E66.01 MORBID OBESITY WITH BMI OF 40.0-44.9, ADULT (HCC): ICD-10-CM

## 2020-05-19 DIAGNOSIS — M06.9 RHEUMATOID ARTHRITIS INVOLVING MULTIPLE SITES, UNSPECIFIED RHEUMATOID FACTOR PRESENCE: ICD-10-CM

## 2020-05-19 DIAGNOSIS — Z00.00 ANNUAL PHYSICAL EXAM: ICD-10-CM

## 2020-05-19 PROCEDURE — 99214 OFFICE O/P EST MOD 30 MIN: CPT | Mod: 95,CR | Performed by: NURSE PRACTITIONER

## 2020-05-19 ASSESSMENT — FIBROSIS 4 INDEX: FIB4 SCORE: 0.47

## 2020-05-19 NOTE — ASSESSMENT & PLAN NOTE
New to me.  Chronic problem.  Reports trying different interventions, improving his diet, minimizing simple carbohydrates.  He also reports working long hours and being active at work.  No extracurricular physical activities outside of work.  Sleeping approximately 6 hours nightly.  Not evaluated for sleep apnea.  Patient states he will not use any machines while he sleeps, and thus sees this evaluation unnecessary.  He drinks 1-4 cups of coffee daily for energy support.  His thyroid was drawn recently with normal TSH and slightly elevated free T4 at 1.75.  He denies CP, palpitations, no dizziness, no constipation.  No reported family history of thyroid issues.  Mother and brother recently underwent bariatric surgery for the same issues.

## 2020-05-19 NOTE — PROGRESS NOTES
Telemedicine Visit: Established Patient     This encounter was conducted via Zoom .   Verbal consent was obtained. Patient's identity was verified.    Subjective:     Chief Complaint   Patient presents with   • [facility]     Cooper Beatty is a 34 y.o. male presenting for evaluation and management of following problems:    Morbid obesity with BMI of 40.0-44.9, adult ([facility])  New to me.  Chronic problem.  Reports trying different interventions, improving his diet, minimizing simple carbohydrates.  He also reports working long hours and being active at work.  No extracurricular physical activities outside of work.  Sleeping approximately 6 hours nightly.  Not evaluated for sleep apnea.  Patient states he will not use any machines while he sleeps, and thus sees this evaluation unnecessary.  He drinks 1-4 cups of coffee daily for energy support.  His thyroid was drawn recently with normal TSH and slightly elevated free T4 at 1.75.  He denies CP, palpitations, no dizziness, no constipation.  No reported family history of thyroid issues.  Mother and brother recently underwent bariatric surgery for the same issues.    Rheumatoid arthritis ([facility])  New to me.  Chronic issue, initially diagnosed at the age of 5.  Managed by pain clinic.      ROS   Denies any recent fevers or chills. No nausea or vomiting. No chest pains or shortness of breath.     Allergies   Allergen Reactions   • Nkda [No Known Drug Allergy]        Current medicines (including changes today)  Current Outpatient Medications   Medication Sig Dispense Refill   • HYDROcodone/acetaminophen (NORCO)  MG Tab Take 1-2 Tabs by mouth every 6 hours as needed.     • gabapentin (NEURONTIN) 300 MG Cap Take 1 to 2 tablets by mouth every 8 hours as needed for nerve pain  Indications: Nerve Pain 180 Cap 5   • morphine (MS IR) 15 MG tablet Take 1 tablet by mouth every 6  hours as needed for pain. 120 Tab 0     No current facility-administered medications for this  "visit.        Patient Active Problem List    Diagnosis Date Noted   • Morbid obesity with BMI of 40.0-44.9, adult ([facility]) 07/19/2018   • Chronic use of opiate drug for therapeutic purpose 05/01/2015   • Elevated liver enzymes 05/01/2015   • Positive QuantiFERON-TB Gold test 05/01/2015   • Vitamin D deficiency 05/01/2015   • Wrist pain 10/30/2013   • Hand pain 10/30/2013   • Carpal tunnel syndrome 10/30/2013   • Hip pain 10/30/2013   • Shoulder pain 10/30/2013   • Low back pain 10/30/2013   • Rheumatoid arthritis (HCC) 11/03/2009       Family History   Problem Relation Age of Onset   • Hypertension Mother    • Hyperlipidemia Mother    • Heart Disease Maternal Grandfather 60        mi   • Diabetes Neg Hx    • Cancer Neg Hx        He  has a past medical history of Arthritis, rheumatoid, spine (HCC) (1990) and Rheumatoid arthritis(714.0) (11/3/2009). He also has no past medical history of ASTHMA, CAD (coronary artery disease), Cancer ([facility]), Congestive heart failure (HCC), COPD, Diabetes, Hypertension, Infectious disease, Liver disease, Psychiatric disorder, Renal disorder, Seizure disorder ([facility]), or Stroke ([facility]).  He  has a past surgical history that includes other neurological surg; other orthopedic surgery; arthroplasty; and laminotomy (2004).       Objective:   Vitals obtained by patient:  Pulse 72   Resp 20   Ht 1.651 m (5' 5\")   Wt 111.1 kg (245 lb)   BMI 40.77 kg/m²      Physical Exam:  General: No acute distress. Well nourished.   HEENT: EOM grossly intact, no scleral icterus, no pharyngeal erythema.   Neck:  No JVD noted at 90 degrees, trachea midline  CVS: Pulse as reported by patient, no visible LE edema.  Resp: Unlabored respiratory effort, no cough or audible wheeze  MSK/Ext: No clubbing or cyanosis visible appreciated.  Skin: No rashes in visible areas.  Neurological: AOx3. CN III-XII grossly intact. No focal deficits.     LABS: 5/4  results reviewed and discussed with the patient, questions " "answered.    Patient was seen for 25 minutes face to face of which > 50% of appointment time was spent on counseling and coordination of care regarding the above.   Assessment and Plan:   1. Annual physical exam  - Lipid Profile; Future  - HEMOGLOBIN A1C; Future  - TSH; Future  - FREE THYROXINE; Future  - THYROID PEROXIDASE  (TPO) AB; Future  - TRIIDOTHYRONINE; Future    2. BMI 40.0-44.9, adult ([facility])  Uncontrolled.  Discussed healthy lifestyle such as low-carb diet with increased fiber, healthy fats and protein.  Will refer to hip program for further evaluation and management.  Discussed etiology and recommendation for sleep study.  - REFERRAL TO SLEEP STUDIES  - REFERRAL TO [facility] IMPROVEMENT PROGRAMS (HIP) Services Requested: [facility]; Reason for Referral? Waist Circumference>40\"; Reason for Visit: Overweight/Obesity  - OBESITY COUNSELING (No Charge): Patient identified as having weight management issue.  Appropriate orders and counseling given.    3. Elevated serum free T4 level  - TSH; Future  - FREE THYROXINE; Future  - THYROID PEROXIDASE  (TPO) AB; Future  - TRIIDOTHYRONINE; Future    4. Morbid obesity with BMI of 40.0-44.9, adult (HCC)    5. Rheumatoid arthritis involving multiple sites, unspecified rheumatoid factor presence ([facility])  Followed by pain and spine clinic.       Follow-up: No follow-ups on file.          "

## 2020-05-23 NOTE — PROGRESS NOTES
Pt requesting referral to Dr. Anderson.  I will inform pt that I have never seen him in past, will need appt.

## 2020-06-22 ENCOUNTER — HOSPITAL ENCOUNTER (OUTPATIENT)
Dept: LAB | Facility: MEDICAL CENTER | Age: 35
End: 2020-06-22
Attending: INTERNAL MEDICINE
Payer: COMMERCIAL

## 2020-06-22 ENCOUNTER — HOSPITAL ENCOUNTER (OUTPATIENT)
Dept: LAB | Facility: MEDICAL CENTER | Age: 35
End: 2020-06-22
Attending: NURSE PRACTITIONER
Payer: COMMERCIAL

## 2020-06-22 DIAGNOSIS — Z00.00 ANNUAL PHYSICAL EXAM: ICD-10-CM

## 2020-06-22 DIAGNOSIS — R79.89 ELEVATED SERUM FREE T4 LEVEL: ICD-10-CM

## 2020-06-22 LAB
ALBUMIN SERPL BCP-MCNC: 4.5 G/DL (ref 3.2–4.9)
ALP SERPL-CCNC: 101 U/L (ref 30–99)
ALT SERPL-CCNC: 40 U/L (ref 2–50)
AST SERPL-CCNC: 29 U/L (ref 12–45)
BASOPHILS # BLD AUTO: 0.5 % (ref 0–1.8)
BASOPHILS # BLD: 0.03 K/UL (ref 0–0.12)
BILIRUB CONJ SERPL-MCNC: <0.2 MG/DL (ref 0.1–0.5)
BILIRUB INDIRECT SERPL-MCNC: ABNORMAL MG/DL (ref 0–1)
BILIRUB SERPL-MCNC: 0.4 MG/DL (ref 0.1–1.5)
CHOLEST SERPL-MCNC: 161 MG/DL (ref 100–199)
CREAT SERPL-MCNC: 0.69 MG/DL (ref 0.5–1.4)
EOSINOPHIL # BLD AUTO: 0.13 K/UL (ref 0–0.51)
EOSINOPHIL NFR BLD: 2.1 % (ref 0–6.9)
ERYTHROCYTE [DISTWIDTH] IN BLOOD BY AUTOMATED COUNT: 41.2 FL (ref 35.9–50)
EST. AVERAGE GLUCOSE BLD GHB EST-MCNC: 120 MG/DL
FASTING STATUS PATIENT QL REPORTED: NORMAL
HBA1C MFR BLD: 5.8 % (ref 0–5.6)
HCT VFR BLD AUTO: 47.4 % (ref 42–52)
HDLC SERPL-MCNC: 52 MG/DL
HGB BLD-MCNC: 16 G/DL (ref 14–18)
IMM GRANULOCYTES # BLD AUTO: 0.01 K/UL (ref 0–0.11)
IMM GRANULOCYTES NFR BLD AUTO: 0.2 % (ref 0–0.9)
LDLC SERPL CALC-MCNC: 88 MG/DL
LYMPHOCYTES # BLD AUTO: 2.13 K/UL (ref 1–4.8)
LYMPHOCYTES NFR BLD: 34 % (ref 22–41)
MCH RBC QN AUTO: 27.8 PG (ref 27–33)
MCHC RBC AUTO-ENTMCNC: 33.8 G/DL (ref 33.7–35.3)
MCV RBC AUTO: 82.3 FL (ref 81.4–97.8)
MONOCYTES # BLD AUTO: 0.53 K/UL (ref 0–0.85)
MONOCYTES NFR BLD AUTO: 8.5 % (ref 0–13.4)
NEUTROPHILS # BLD AUTO: 3.44 K/UL (ref 1.82–7.42)
NEUTROPHILS NFR BLD: 54.7 % (ref 44–72)
NRBC # BLD AUTO: 0 K/UL
NRBC BLD-RTO: 0 /100 WBC
PLATELET # BLD AUTO: 216 K/UL (ref 164–446)
PMV BLD AUTO: 10.1 FL (ref 9–12.9)
PROT SERPL-MCNC: 7.5 G/DL (ref 6–8.2)
RBC # BLD AUTO: 5.76 M/UL (ref 4.7–6.1)
T3 SERPL-MCNC: 115 NG/DL (ref 60–181)
T4 FREE SERPL-MCNC: 1.47 NG/DL (ref 0.93–1.7)
THYROPEROXIDASE AB SERPL-ACNC: <9 IU/ML (ref 0–9)
TRIGL SERPL-MCNC: 103 MG/DL (ref 0–149)
TSH SERPL DL<=0.005 MIU/L-ACNC: 0.87 UIU/ML (ref 0.38–5.33)
WBC # BLD AUTO: 6.3 K/UL (ref 4.8–10.8)

## 2020-06-22 PROCEDURE — 80076 HEPATIC FUNCTION PANEL: CPT

## 2020-06-22 PROCEDURE — 83036 HEMOGLOBIN GLYCOSYLATED A1C: CPT

## 2020-06-22 PROCEDURE — 84443 ASSAY THYROID STIM HORMONE: CPT

## 2020-06-22 PROCEDURE — 84439 ASSAY OF FREE THYROXINE: CPT

## 2020-06-22 PROCEDURE — 85025 COMPLETE CBC W/AUTO DIFF WBC: CPT

## 2020-06-22 PROCEDURE — 36415 COLL VENOUS BLD VENIPUNCTURE: CPT

## 2020-06-22 PROCEDURE — 86376 MICROSOMAL ANTIBODY EACH: CPT

## 2020-06-22 PROCEDURE — 82565 ASSAY OF CREATININE: CPT

## 2020-06-22 PROCEDURE — 80061 LIPID PANEL: CPT

## 2020-06-22 PROCEDURE — 84480 ASSAY TRIIODOTHYRONINE (T3): CPT

## 2020-08-23 ENCOUNTER — HOSPITAL ENCOUNTER (OUTPATIENT)
Dept: RADIOLOGY | Facility: MEDICAL CENTER | Age: 35
End: 2020-08-23
Attending: INTERNAL MEDICINE
Payer: COMMERCIAL

## 2020-08-23 DIAGNOSIS — A15.9 RESPIRATORY TUBERCULOSIS: ICD-10-CM

## 2020-08-23 PROCEDURE — 71046 X-RAY EXAM CHEST 2 VIEWS: CPT

## 2020-08-25 ENCOUNTER — HOSPITAL ENCOUNTER (OUTPATIENT)
Dept: LAB | Facility: MEDICAL CENTER | Age: 35
End: 2020-08-25
Attending: INTERNAL MEDICINE
Payer: COMMERCIAL

## 2020-08-25 LAB
ALBUMIN SERPL BCP-MCNC: 4.2 G/DL (ref 3.2–4.9)
ALBUMIN/GLOB SERPL: 1.6 G/DL
ALP SERPL-CCNC: 94 U/L (ref 30–99)
ALT SERPL-CCNC: 30 U/L (ref 2–50)
ANION GAP SERPL CALC-SCNC: 11 MMOL/L (ref 7–16)
AST SERPL-CCNC: 21 U/L (ref 12–45)
BASOPHILS # BLD AUTO: 0.1 % (ref 0–1.8)
BASOPHILS # BLD: 0.01 K/UL (ref 0–0.12)
BILIRUB SERPL-MCNC: 0.3 MG/DL (ref 0.1–1.5)
BUN SERPL-MCNC: 14 MG/DL (ref 8–22)
CALCIUM SERPL-MCNC: 9.3 MG/DL (ref 8.5–10.5)
CHLORIDE SERPL-SCNC: 104 MMOL/L (ref 96–112)
CO2 SERPL-SCNC: 24 MMOL/L (ref 20–33)
CREAT SERPL-MCNC: 0.74 MG/DL (ref 0.5–1.4)
CRP SERPL HS-MCNC: 0.27 MG/DL (ref 0–0.75)
EOSINOPHIL # BLD AUTO: 0.08 K/UL (ref 0–0.51)
EOSINOPHIL NFR BLD: 1.2 % (ref 0–6.9)
ERYTHROCYTE [DISTWIDTH] IN BLOOD BY AUTOMATED COUNT: 44.2 FL (ref 35.9–50)
ERYTHROCYTE [SEDIMENTATION RATE] IN BLOOD BY WESTERGREN METHOD: 0 MM/HOUR (ref 0–15)
GLOBULIN SER CALC-MCNC: 2.6 G/DL (ref 1.9–3.5)
GLUCOSE SERPL-MCNC: 122 MG/DL (ref 65–99)
HCT VFR BLD AUTO: 46.3 % (ref 42–52)
HGB BLD-MCNC: 15.4 G/DL (ref 14–18)
IMM GRANULOCYTES # BLD AUTO: 0.01 K/UL (ref 0–0.11)
IMM GRANULOCYTES NFR BLD AUTO: 0.1 % (ref 0–0.9)
LYMPHOCYTES # BLD AUTO: 1.76 K/UL (ref 1–4.8)
LYMPHOCYTES NFR BLD: 26.1 % (ref 22–41)
MCH RBC QN AUTO: 28.3 PG (ref 27–33)
MCHC RBC AUTO-ENTMCNC: 33.3 G/DL (ref 33.7–35.3)
MCV RBC AUTO: 85.1 FL (ref 81.4–97.8)
MONOCYTES # BLD AUTO: 0.52 K/UL (ref 0–0.85)
MONOCYTES NFR BLD AUTO: 7.7 % (ref 0–13.4)
NEUTROPHILS # BLD AUTO: 4.36 K/UL (ref 1.82–7.42)
NEUTROPHILS NFR BLD: 64.8 % (ref 44–72)
NRBC # BLD AUTO: 0 K/UL
NRBC BLD-RTO: 0 /100 WBC
PLATELET # BLD AUTO: 213 K/UL (ref 164–446)
PMV BLD AUTO: 10.1 FL (ref 9–12.9)
POTASSIUM SERPL-SCNC: 4.2 MMOL/L (ref 3.6–5.5)
PROT SERPL-MCNC: 6.8 G/DL (ref 6–8.2)
RBC # BLD AUTO: 5.44 M/UL (ref 4.7–6.1)
SODIUM SERPL-SCNC: 139 MMOL/L (ref 135–145)
WBC # BLD AUTO: 6.7 K/UL (ref 4.8–10.8)

## 2020-08-25 PROCEDURE — 80053 COMPREHEN METABOLIC PANEL: CPT

## 2020-08-25 PROCEDURE — 36415 COLL VENOUS BLD VENIPUNCTURE: CPT

## 2020-08-25 PROCEDURE — 85025 COMPLETE CBC W/AUTO DIFF WBC: CPT

## 2020-08-25 PROCEDURE — 85652 RBC SED RATE AUTOMATED: CPT

## 2020-08-25 PROCEDURE — 86140 C-REACTIVE PROTEIN: CPT

## 2020-12-29 ENCOUNTER — HOSPITAL ENCOUNTER (OUTPATIENT)
Dept: RADIOLOGY | Facility: MEDICAL CENTER | Age: 35
End: 2020-12-29
Attending: COLON & RECTAL SURGERY

## 2020-12-29 DIAGNOSIS — E66.01 MORBID OBESITY (HCC): ICD-10-CM

## 2020-12-29 DIAGNOSIS — Z01.818 PREOP EXAMINATION: ICD-10-CM

## 2020-12-29 PROCEDURE — 74246 X-RAY XM UPR GI TRC 2CNTRST: CPT

## 2021-04-28 ENCOUNTER — OFFICE VISIT (OUTPATIENT)
Dept: URGENT CARE | Facility: PHYSICIAN GROUP | Age: 36
End: 2021-04-28
Payer: COMMERCIAL

## 2021-04-28 VITALS
WEIGHT: 190 LBS | TEMPERATURE: 97.7 F | HEIGHT: 65 IN | HEART RATE: 62 BPM | SYSTOLIC BLOOD PRESSURE: 98 MMHG | DIASTOLIC BLOOD PRESSURE: 66 MMHG | OXYGEN SATURATION: 98 % | BODY MASS INDEX: 31.65 KG/M2 | RESPIRATION RATE: 12 BRPM

## 2021-04-28 DIAGNOSIS — M62.838 MUSCLE SPASM: ICD-10-CM

## 2021-04-28 DIAGNOSIS — S39.012A STRAIN OF LUMBAR PARASPINOUS MUSCLE, INITIAL ENCOUNTER: ICD-10-CM
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
## 2021-04-28 PROCEDURE — 99213 OFFICE O/P EST LOW 20 MIN: CPT | Performed by: PHYSICIAN ASSISTANT

## 2021-04-28 RX ORDER — ADALIMUMAB 40MG/0.4ML
40 KIT SUBCUTANEOUS
COMMUNITY
Start: 2021-04-19

## 2021-04-28 RX ORDER — BUPRENORPHINE HYDROCHLORIDE 300 UG/1
450 FILM, SOLUBLE BUCCAL
COMMUNITY
Start: 2021-04-24 | End: 2021-09-24

## 2021-04-28 RX ORDER — BUPRENORPHINE HYDROCHLORIDE 150 UG/1
FILM, SOLUBLE BUCCAL
COMMUNITY
Start: 2021-04-06 | End: 2021-05-03

## 2021-04-28 RX ORDER — CYCLOBENZAPRINE HCL 5 MG
5-10 TABLET ORAL
Qty: 20 TABLET | Refills: 0 | Status: SHIPPED
Start: 2021-04-28 | End: 2021-05-06

## 2021-04-28 ASSESSMENT — PAIN SCALES - GENERAL: PAINLEVEL: 7=MODERATE-SEVERE PAIN

## 2021-04-28 ASSESSMENT — ENCOUNTER SYMPTOMS
SHORTNESS OF BREATH: 0
BOWEL INCONTINENCE: 0
PERIANAL NUMBNESS: 0
PARESTHESIAS: 0
BACK PAIN: 1
WEAKNESS: 0
NUMBNESS: 0
EYE DISCHARGE: 0
HEADACHES: 0
FEVER: 0
TINGLING: 0
COUGH: 0
SORE THROAT: 0
NAUSEA: 0
EYE REDNESS: 0
VOMITING: 0

## 2021-04-28 ASSESSMENT — FIBROSIS 4 INDEX: FIB4 SCORE: 0.59
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
## 2021-04-28 NOTE — LETTER
April 28, 2021         Patient: Cooper Beatty   YOB: 1985   Date of Visit: 4/28/2021           To Whom it May Concern:    Cooper Beatty was seen in my clinic on 4/28/2021. Please excuse him from work 4/28-4/30. He may return to work 5/3.       If you have any questions or concerns, please don't hesitate to call.        Sincerely,           Isabelle June P.A.-C.  Electronically Signed

## 2021-05-03 ENCOUNTER — OFFICE VISIT (OUTPATIENT)
Dept: URGENT CARE | Facility: PHYSICIAN GROUP | Age: 36
End: 2021-05-03
Payer: COMMERCIAL

## 2021-05-03 VITALS
RESPIRATION RATE: 16 BRPM | HEART RATE: 62 BPM | WEIGHT: 190 LBS | BODY MASS INDEX: 31.65 KG/M2 | OXYGEN SATURATION: 98 % | HEIGHT: 65 IN | TEMPERATURE: 98 F | SYSTOLIC BLOOD PRESSURE: 116 MMHG | DIASTOLIC BLOOD PRESSURE: 80 MMHG

## 2021-05-03 DIAGNOSIS — S39.012A STRAIN OF LUMBAR PARASPINOUS MUSCLE, INITIAL ENCOUNTER: ICD-10-CM
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
## 2021-05-03 DIAGNOSIS — M62.838 MUSCLE SPASM: ICD-10-CM

## 2021-05-03 PROCEDURE — 99213 OFFICE O/P EST LOW 20 MIN: CPT | Performed by: PHYSICIAN ASSISTANT

## 2021-05-03 ASSESSMENT — ENCOUNTER SYMPTOMS
TINGLING: 0
PALPITATIONS: 0
SHORTNESS OF BREATH: 0
VOMITING: 0
SENSORY CHANGE: 0
BACK PAIN: 1
NECK PAIN: 1
ABDOMINAL PAIN: 0
BLURRED VISION: 0
NAUSEA: 0
HEADACHES: 0
CHILLS: 0
FEVER: 0
WEIGHT LOSS: 0

## 2021-05-03 ASSESSMENT — FIBROSIS 4 INDEX: FIB4 SCORE: 0.59
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
## 2021-05-03 NOTE — LETTER
May 3, 2021         Patient: Cooper Beatty   YOB: 1985   Date of Visit: 5/3/2021           To Whom it May Concern:    Cooper Beatty was seen in my clinic on 5/3/2021. Please excuse him from work for the next 3 days.    If you have any questions or concerns, please don't hesitate to call.        Sincerely,           Tere Lopez P.A.-C.  Electronically Signed

## 2021-05-05 ENCOUNTER — APPOINTMENT (OUTPATIENT)
Dept: MEDICAL GROUP | Facility: PHYSICIAN GROUP | Age: 36
End: 2021-05-05
Payer: COMMERCIAL

## 2021-05-06 ENCOUNTER — TELEMEDICINE (OUTPATIENT)
Dept: MEDICAL GROUP | Facility: PHYSICIAN GROUP | Age: 36
End: 2021-05-06
Payer: COMMERCIAL

## 2021-05-06 VITALS — WEIGHT: 190 LBS | BODY MASS INDEX: 31.65 KG/M2 | HEIGHT: 65 IN

## 2021-05-06 DIAGNOSIS — M54.50 ACUTE LEFT-SIDED LOW BACK PAIN WITHOUT SCIATICA: ICD-10-CM

## 2021-05-06 PROCEDURE — 99213 OFFICE O/P EST LOW 20 MIN: CPT | Mod: 95,CR | Performed by: NURSE PRACTITIONER

## 2021-05-06 RX ORDER — METHYLPREDNISOLONE 4 MG/1
TABLET ORAL
Qty: 21 TABLET | Refills: 0 | Status: SHIPPED
Start: 2021-05-06 | End: 2021-05-20

## 2021-05-06 RX ORDER — TIZANIDINE 4 MG/1
4 TABLET ORAL 2 TIMES DAILY
Qty: 14 TABLET | Refills: 0 | Status: SHIPPED | OUTPATIENT
Start: 2021-05-06 | End: 2021-05-13

## 2021-05-06 ASSESSMENT — FIBROSIS 4 INDEX: FIB4 SCORE: 0.59

## 2021-05-06 ASSESSMENT — PATIENT HEALTH QUESTIONNAIRE - PHQ9: CLINICAL INTERPRETATION OF PHQ2 SCORE: 0
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
## 2021-05-06 NOTE — LETTER
May 6, 2021       Patient: Cooper Beatty   YOB: 1985   Date of Visit: 5/6/2021         To Whom It May Concern:    In my medical opinion, I recommend that Cooper Beatty remain out of work until presumptive date of 5/26/21 to accommodate physical rehabilitation and attend ordered physical therapy.  His condition will be reassessed in the next 2 weeks.    If you have any questions or concerns, please don't hesitate to call [phone]          Sincerely,          MP Tay.  Electronically Signed
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
## 2021-05-20 ENCOUNTER — TELEMEDICINE (OUTPATIENT)
Dept: MEDICAL GROUP | Facility: PHYSICIAN GROUP | Age: 36
End: 2021-05-20
Payer: COMMERCIAL

## 2021-05-20 VITALS — BODY MASS INDEX: 31.65 KG/M2 | HEIGHT: 65 IN | WEIGHT: 190 LBS

## 2021-05-20 DIAGNOSIS — M54.2 PAIN IN NECK: ICD-10-CM

## 2021-05-20 DIAGNOSIS — M54.50 ACUTE LEFT-SIDED LOW BACK PAIN WITHOUT SCIATICA: ICD-10-CM

## 2021-05-20 DIAGNOSIS — Z96.643 HISTORY OF HIP REPLACEMENT, TOTAL, BILATERAL: ICD-10-CM

## 2021-05-20 DIAGNOSIS — M54.50 NONSPECIFIC PAIN IN THE LUMBAR REGION: ICD-10-CM

## 2021-05-20 PROCEDURE — 99214 OFFICE O/P EST MOD 30 MIN: CPT | Mod: 95,CR | Performed by: NURSE PRACTITIONER

## 2021-05-20 RX ORDER — PREDNISONE 20 MG/1
20 TABLET ORAL DAILY
Qty: 10 TABLET | Refills: 1 | Status: SHIPPED | OUTPATIENT
Start: 2021-05-20 | End: 2021-05-30

## 2021-05-20 RX ORDER — TIZANIDINE 4 MG/1
4 TABLET ORAL 2 TIMES DAILY
Qty: 20 TABLET | Refills: 1 | Status: SHIPPED | OUTPATIENT
Start: 2021-05-20 | End: 2021-05-30

## 2021-05-20 ASSESSMENT — FIBROSIS 4 INDEX: FIB4 SCORE: 0.59

## 2021-05-20 NOTE — LETTER
May 20, 2021    To Whom It May Concern:         My patient, Cooper Beatty is not advised to return to work until he is symptom free for 24 hours.          If you have any questions please do not hesitate to call me at the phone number listed below.    Sincerely,          Jalyn BERNAL,FNP-C    [phone]

## 2021-05-20 NOTE — ASSESSMENT & PLAN NOTE
Patient is here for reevaluation.  Pain described as mainly in his lower back, slightly deviating to the left side, no radiation to the lower extremities.  He denies saddle paresthesia, no weakness or tingling to the extremities.  Associated symptom is pain in his neck and thoracic region, lesser extent than lumbar.  Last visit he was prescribed gabapentin and Medrol Dosepak.  The corticosteroid was somewhat helpful, however symptoms came back after patient finished prescribed therapy  Patient was also assessed at  on 4/25/2021, no imaging was done at that time.  Patient states he is getting worse.  He did return to work x 3 days after his visit in primary care on 5/6/2021. He is questing note for work as he symptoms not well controlled and he is required to lift heavy boxes at [employer].  Patient is scheduled to see PT on 6/7/2021.  History of rheumatoid arthritis since age of 5, 2 total hip replacements at the age of 17 and 18.  He is followed by rheumatology and pain management at [facility], Dr. Fatou Gómez. On gabapentin for carpal tunnel.  He denies any additional symptoms, no saddle paresthesia, incontinence, weakness or tingling.
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
## 2021-06-01 ENCOUNTER — HOSPITAL ENCOUNTER (OUTPATIENT)
Dept: RADIOLOGY | Facility: MEDICAL CENTER | Age: 36
End: 2021-06-01
Attending: NURSE PRACTITIONER
Payer: COMMERCIAL

## 2021-06-01 DIAGNOSIS — M54.2 PAIN IN NECK: ICD-10-CM

## 2021-06-01 DIAGNOSIS — Z96.643 HISTORY OF HIP REPLACEMENT, TOTAL, BILATERAL: ICD-10-CM

## 2021-06-01 DIAGNOSIS — M54.50 NONSPECIFIC PAIN IN THE LUMBAR REGION: ICD-10-CM

## 2021-06-01 DIAGNOSIS — M54.50 LEFT LOW BACK PAIN, UNSPECIFIED CHRONICITY, UNSPECIFIED WHETHER SCIATICA PRESENT: ICD-10-CM

## 2021-06-01 DIAGNOSIS — M54.50 ACUTE LEFT-SIDED LOW BACK PAIN WITHOUT SCIATICA: ICD-10-CM

## 2021-06-01 PROCEDURE — 72072 X-RAY EXAM THORAC SPINE 3VWS: CPT

## 2021-06-01 PROCEDURE — 72100 X-RAY EXAM L-S SPINE 2/3 VWS: CPT

## 2021-06-01 PROCEDURE — 73522 X-RAY EXAM HIPS BI 3-4 VIEWS: CPT

## 2021-06-01 PROCEDURE — 72040 X-RAY EXAM NECK SPINE 2-3 VW: CPT

## 2021-06-02 ENCOUNTER — TELEPHONE (OUTPATIENT)
Dept: MEDICAL GROUP | Facility: PHYSICIAN GROUP | Age: 36
End: 2021-06-02

## 2021-06-02 NOTE — TELEPHONE ENCOUNTER
Phone Number Called: [phone] (home)       Call outcome: Spoke to patient regarding message below.    Message: Called to inform patient that his imaging results were reviewed, and are within normal limits. Continue with PT

## 2021-06-24 ENCOUNTER — APPOINTMENT (OUTPATIENT)
Dept: PHYSICAL THERAPY | Facility: REHABILITATION | Age: 36
End: 2021-06-24
Attending: NURSE PRACTITIONER
Payer: COMMERCIAL

## 2021-06-30 ENCOUNTER — APPOINTMENT (OUTPATIENT)
Dept: PHYSICAL THERAPY | Facility: REHABILITATION | Age: 36
End: 2021-06-30
Attending: NURSE PRACTITIONER
Payer: COMMERCIAL

## 2021-07-07 ENCOUNTER — APPOINTMENT (OUTPATIENT)
Dept: PHYSICAL THERAPY | Facility: REHABILITATION | Age: 36
End: 2021-07-07
Attending: NURSE PRACTITIONER
Payer: COMMERCIAL

## 2021-07-14 ENCOUNTER — APPOINTMENT (OUTPATIENT)
Dept: PHYSICAL THERAPY | Facility: REHABILITATION | Age: 36
End: 2021-07-14
Attending: NURSE PRACTITIONER
Payer: COMMERCIAL

## 2021-07-21 ENCOUNTER — APPOINTMENT (OUTPATIENT)
Dept: PHYSICAL THERAPY | Facility: REHABILITATION | Age: 36
End: 2021-07-21
Attending: NURSE PRACTITIONER
Payer: COMMERCIAL

## 2021-07-28 ENCOUNTER — APPOINTMENT (OUTPATIENT)
Dept: PHYSICAL THERAPY | Facility: REHABILITATION | Age: 36
End: 2021-07-28
Attending: NURSE PRACTITIONER
Payer: COMMERCIAL

## 2021-08-03 ENCOUNTER — OFFICE VISIT (OUTPATIENT)
Dept: URGENT CARE | Facility: PHYSICIAN GROUP | Age: 36
End: 2021-08-03
Payer: COMMERCIAL

## 2021-08-03 VITALS
RESPIRATION RATE: 16 BRPM | DIASTOLIC BLOOD PRESSURE: 82 MMHG | HEART RATE: 90 BPM | HEIGHT: 65 IN | SYSTOLIC BLOOD PRESSURE: 110 MMHG | OXYGEN SATURATION: 99 % | BODY MASS INDEX: 34.16 KG/M2 | WEIGHT: 205 LBS | TEMPERATURE: 96.7 F

## 2021-08-03 DIAGNOSIS — S01.01XA LACERATION OF SCALP WITHOUT FOREIGN BODY, INITIAL ENCOUNTER: ICD-10-CM

## 2021-08-03 PROCEDURE — 12002 RPR S/N/AX/GEN/TRNK2.6-7.5CM: CPT | Performed by: PHYSICIAN ASSISTANT

## 2021-08-03 RX ORDER — HYDROCODONE BITARTRATE AND ACETAMINOPHEN 5; 325 MG/1; MG/1
TABLET ORAL
COMMUNITY
Start: 2021-07-22

## 2021-08-03 RX ORDER — BUPRENORPHINE HYDROCHLORIDE 600 UG/1
FILM, SOLUBLE BUCCAL
COMMUNITY
Start: 2021-07-29

## 2021-08-03 ASSESSMENT — ENCOUNTER SYMPTOMS
DOUBLE VISION: 0
SEIZURES: 0
LOSS OF CONSCIOUSNESS: 0
VOMITING: 0
NAUSEA: 0
BLURRED VISION: 0
SPEECH CHANGE: 0
SENSORY CHANGE: 0

## 2021-08-03 ASSESSMENT — FIBROSIS 4 INDEX: FIB4 SCORE: 0.61

## 2021-08-03 NOTE — PROCEDURES
Laceration Repair    Date/Time: 8/3/2021 9:22 AM  Performed by: Sami Gonzalez P.A.-C.  Authorized by: Sami Gonzalez P.A.-C.   Body area: head/neck  Location details: scalp  Laceration length: 4.5 cm  Foreign bodies: no foreign bodies  Tendon involvement: none  Nerve involvement: none  Vascular damage: no  Anesthesia: local infiltration    Anesthesia:  Local Anesthetic: lidocaine 1% with epinephrine  Anesthetic total: 2 mL    Sedation:  Patient sedated: no    Irrigation solution: tap water (hibiclens)  Irrigation method: syringe  Amount of cleaning: standard  Debridement: none  Degree of undermining: none  Skin closure: staples  Number of sutures: 6  Technique: simple  Approximation: close  Approximation difficulty: simple  Dressing: pressure dressing  Patient tolerance: patient tolerated the procedure well with no immediate complications

## 2021-08-03 NOTE — PATIENT INSTRUCTIONS
Staple Wound Closure  Staples are used to help a wound heal faster by holding the edges of the wound together.  HOME CARE  · Keep the area around the staples clean and dry.  · Rest and raise (elevate) the injured part above the level of your heart.  · See your doctor for a follow-up check of the wound.  · See your doctor to have the staples removed.  · Clean the wound daily with water.  · Do not soak the wound in water for long periods of time.  · Let air reach the wound as it heals.  GET HELP RIGHT AWAY IF:   · You have redness or puffiness around the wound.  · You have a red line going away from the wound.  · You have more pain or tenderness.  · You have yellowish-white fluid (pus) coming from the wound.  · Your wound does not stay together after the staples have been taken out.  · You see something coming out of the wound, such as wood or glass.  · You have problems moving the injured area.  · You have a fever or lasting symptoms for more than 2-3 days.  · You have a fever and your symptoms suddenly get worse.  MAKE SURE YOU:   · Understand these instructions.  · Will watch this condition.  · Will get help right away if you are not doing well or get worse.  Document Released: 09/26/2009 Document Revised: 09/11/2013 Document Reviewed: 06/30/2013  [employer]® Patient Information ©2014 [employer].

## 2021-08-03 NOTE — PROGRESS NOTES
Subjective:   Cooper Beatty is a 36 y.o. male who presents for Head Injury (back of head lac @7am)        Patient presents for evaluation of scalp laceration that occurred earlier this morning when he was working in his garage.  States that he hit the back of his head on a cabinet in the garage.  His wife encouraged him to have this medically evaluated.  He denies loss of consciousness, vision changes, nausea, vomiting.  Tenderness of dated in 2018.    Review of Systems   Eyes: Negative for blurred vision and double vision.   Gastrointestinal: Negative for nausea and vomiting.   Neurological: Negative for sensory change, speech change, seizures and loss of consciousness.       PMH:  has a past medical history of Arthritis, rheumatoid, spine (HCC) (1990) and Rheumatoid arthritis(714.0) (11/3/2009). He also has no past medical history of ASTHMA, CAD (coronary artery disease), Cancer ([facility]), Congestive heart failure ([facility]), COPD, Diabetes, Hypertension, Infectious disease, Liver disease, Psychiatric disorder, Renal disorder, Seizure disorder ([facility]), or Stroke ([facility]).  MEDS:   Current Outpatient Medications:   •  diclofenac sodium 1 % Gel, Place 1 g on the skin 3 times a day., Disp: 100 g, Rfl: 1  •  HUMIRA PEN 40 MG/0.4ML Pen-injector Kit, 40 mg., Disp: , Rfl:   •  BELBUCA 300 MCG FILM, 450 mcg., Disp: , Rfl:   •  gabapentin (NEURONTIN) 300 MG Cap, Take 1 to 2 tablets by mouth every 8 hours as needed for nerve pain  Indications: Nerve Pain, Disp: 180 Cap, Rfl: 5  •  BELBUCA 600 MCG FILM, , Disp: , Rfl:   •  HYDROcodone-acetaminophen (NORCO) 5-325 MG Tab per tablet, , Disp: , Rfl:   ALLERGIES:   Allergies   Allergen Reactions   • Nkda [No Known Drug Allergy]      SURGHX:   Past Surgical History:   Procedure Laterality Date   • LAMINOTOMY  2004   • ARTHROPLASTY      hip replacements was 19 and 18 yr age   • OTHER NEUROLOGICAL SURG      back x 4   • OTHER ORTHOPEDIC SURGERY      2 complete hip replacements, ankle  "    SOCHX:  reports that he has never smoked. He has never used smokeless tobacco. He reports that he does not drink alcohol and does not use drugs.  FH: Family history was reviewed, no pertinent findings to report   Objective:   /82   Pulse 90   Temp 35.9 °C (96.7 °F) (Temporal)   Resp 16   Ht 1.651 m (5' 5\")   Wt 93 kg (205 lb)   SpO2 99%   BMI 34.11 kg/m²   Physical Exam  Vitals reviewed.   Constitutional:       General: He is not in acute distress.     Appearance: Normal appearance. He is well-developed. He is not toxic-appearing.   HENT:      Head: Normocephalic and atraumatic.        Right Ear: External ear normal.      Left Ear: External ear normal.      Nose: Nose normal.   Eyes:      General: Gaze aligned appropriately.   Cardiovascular:      Rate and Rhythm: Normal rate and regular rhythm.   Pulmonary:      Effort: Pulmonary effort is normal. No respiratory distress.      Breath sounds: No stridor.   Musculoskeletal:      Cervical back: Neck supple.   Skin:     General: Skin is warm and dry.      Capillary Refill: Capillary refill takes less than 2 seconds.   Neurological:      Mental Status: He is alert and oriented to person, place, and time.      Comments: CN2-12 grossly intact   Psychiatric:         Speech: Speech normal.         Behavior: Behavior normal.           Assessment/Plan:   1. Laceration of scalp without foreign body, initial encounter    Other orders  - BELBUCA 600 MCG FILM  - HYDROcodone-acetaminophen (NORCO) 5-325 MG Tab per tablet    Tdap updated 7/19/2018.  Wound thoroughly cleaned and stapled closed.  Return to clinic in 8 to 10 days for staple removal.  Signs and symptoms of infection discussed.  Return to clinic sooner if any of these develop.  Wound care instructions reviewed at length.    Differential diagnosis, natural history, supportive care, and indications for immediate follow-up discussed.  "

## 2021-09-07 DIAGNOSIS — M54.50 ACUTE LEFT-SIDED LOW BACK PAIN WITHOUT SCIATICA: ICD-10-CM

## 2021-09-24 ENCOUNTER — TELEMEDICINE (OUTPATIENT)
Dept: MEDICAL GROUP | Facility: PHYSICIAN GROUP | Age: 36
End: 2021-09-24
Payer: COMMERCIAL

## 2021-09-24 VITALS — WEIGHT: 205 LBS | HEIGHT: 65 IN | BODY MASS INDEX: 34.16 KG/M2

## 2021-09-24 DIAGNOSIS — R53.82 CHRONIC FATIGUE: ICD-10-CM

## 2021-09-24 DIAGNOSIS — K21.9 GASTROESOPHAGEAL REFLUX DISEASE WITHOUT ESOPHAGITIS: ICD-10-CM

## 2021-09-24 PROCEDURE — 99213 OFFICE O/P EST LOW 20 MIN: CPT | Mod: 95 | Performed by: NURSE PRACTITIONER

## 2021-09-24 RX ORDER — OMEPRAZOLE 40 MG/1
40 CAPSULE, DELAYED RELEASE ORAL DAILY
Qty: 90 CAPSULE | Refills: 1 | Status: SHIPPED
Start: 2021-09-24 | End: 2022-03-15

## 2021-09-24 ASSESSMENT — FIBROSIS 4 INDEX: FIB4 SCORE: 0.61

## 2021-09-24 NOTE — ASSESSMENT & PLAN NOTE
New problem.  Noted chronic daily fatigue since bariatric surgery, sleeve in May 2021.  No lab work for the last 4 months.  Denies CP, dyspnea, blood in urine or stool, no dizziness.

## 2021-09-24 NOTE — PROGRESS NOTES
Telemedicine Visit: Established Patient     This encounter was conducted via Zoom.   Verbal consent was obtained. Patient's identity was verified.    Subjective:     Chief Complaint   Patient presents with   • GI Problem     After Gastric bypass heart burn symptoms and reflux taking tums, requesting pantropozole     Cooper Beatty is a 36 y.o. male presenting for evaluation and management of following problems:    Chronic fatigue  New problem.  Noted chronic daily fatigue since bariatric surgery, sleeve in May 2021.  No lab work for the last 4 months.  Denies CP, dyspnea, blood in urine or stool, no dizziness.      ROS   Denies any recent fevers or chills. No nausea or vomiting. No chest pains or shortness of breath.     Allergies   Allergen Reactions   • Nkda [No Known Drug Allergy]        Current medicines (including changes today)  Current Outpatient Medications   Medication Sig Dispense Refill   • omeprazole (PRILOSEC) 40 MG delayed-release capsule Take 1 Capsule by mouth every day. 90 Capsule 1   • diclofenac sodium (VOLTAREN) 1 % Gel PLACE ONE GRAM ON THE SKIN THREE TIMES A  g 0   • BELBUCA 600 MCG FILM      • HYDROcodone-acetaminophen (NORCO) 5-325 MG Tab per tablet      • HUMIRA PEN 40 MG/0.4ML Pen-injector Kit 40 mg.     • gabapentin (NEURONTIN) 300 MG Cap Take 1 to 2 tablets by mouth every 8 hours as needed for nerve pain  Indications: Nerve Pain 180 Cap 5     No current facility-administered medications for this visit.       Patient Active Problem List    Diagnosis Date Noted   • Gastroesophageal reflux disease without esophagitis 09/24/2021   • Chronic fatigue 09/24/2021   • Acute left-sided low back pain without sciatica 05/06/2021   • Morbid obesity with BMI of 40.0-44.9, adult (HCC) 07/19/2018   • Chronic use of opiate drug for therapeutic purpose 05/01/2015   • Elevated liver enzymes 05/01/2015   • Positive QuantiFERON-TB Gold test 05/01/2015   • Vitamin D deficiency 05/01/2015   •  "Wrist pain 10/30/2013   • Hand pain 10/30/2013   • Carpal tunnel syndrome 10/30/2013   • Hip pain 10/30/2013   • Shoulder pain 10/30/2013   • Low back pain 10/30/2013   • Rheumatoid arthritis (HCC) 11/03/2009       Family History   Problem Relation Age of Onset   • Hypertension Mother    • Hyperlipidemia Mother    • Heart Disease Maternal Grandfather 60        mi   • Diabetes Neg Hx    • Cancer Neg Hx        He  has a past medical history of Arthritis, rheumatoid, spine (HCC) (1990) and Rheumatoid arthritis(714.0) (11/3/2009). He also has no past medical history of ASTHMA, CAD (coronary artery disease), Cancer (HCC), Congestive heart failure (HCC), COPD, Diabetes, Hypertension, Infectious disease, Liver disease, Psychiatric disorder, Renal disorder, Seizure disorder (HCC), or Stroke (HCC).  He  has a past surgical history that includes other neurological surg; other orthopedic surgery; arthroplasty; and laminotomy (2004).       Objective:   Vitals obtained by patient:  Ht 1.651 m (5' 5\")   Wt 93 kg (205 lb)   BMI 34.11 kg/m²      Physical Exam:  General: No acute distress. Well nourished.   HEENT: EOM grossly intact, no scleral icterus, no pharyngeal erythema.   Neck:  No JVD noted at 90 degrees, trachea midline  CVS: Pulse as reported by patient, no visible LE edema.  Resp: Unlabored respiratory effort, no cough or audible wheeze  MSK/Ext: No clubbing or cyanosis visible appreciated.  Skin: No rashes in visible areas.  Neurological: AOx3. CN III-XII grossly intact. No focal deficits.     LABS: 2020  results reviewed and discussed with the patient, questions answered.    My total time spent caring for the patient on the day of the encounter was 15 minutes.   This does not include time spent on separately billable procedures/tests.   Assessment and Plan:   1. Gastroesophageal reflux disease without esophagitis  Uncontrolled, stable.  Educated on etiology.  Advised to avoid known triggers.  Trial of Prilosec 40 mg.  " We will follow-up next visit  - omeprazole (PRILOSEC) 40 MG delayed-release capsule; Take 1 Capsule by mouth every day.  Dispense: 90 Capsule; Refill: 1    2. Chronic fatigue  Uncontrolled, stable.  Will obtain lab work will check for anemia and electrolyte deficiency.  We will follow-up next visit.  - Comp Metabolic Panel; Future  - CBC WITHOUT DIFFERENTIAL; Future  - Lipid Profile; Future  - TSH; Future  - VITAMIN D,25 HYDROXY; Future  - VITAMIN B12; Future  - IRON/TOTAL IRON BIND; Future  - IRON; Future  - FERRITIN; Future       Follow-up: No follow-ups on file.

## 2022-03-15 ENCOUNTER — OFFICE VISIT (OUTPATIENT)
Dept: MEDICAL GROUP | Facility: PHYSICIAN GROUP | Age: 37
End: 2022-03-15
Payer: COMMERCIAL

## 2022-03-15 VITALS
OXYGEN SATURATION: 95 % | BODY MASS INDEX: 35.82 KG/M2 | RESPIRATION RATE: 12 BRPM | SYSTOLIC BLOOD PRESSURE: 118 MMHG | DIASTOLIC BLOOD PRESSURE: 82 MMHG | HEIGHT: 65 IN | TEMPERATURE: 98.2 F | WEIGHT: 215 LBS | HEART RATE: 62 BPM

## 2022-03-15 DIAGNOSIS — R53.82 CHRONIC FATIGUE: ICD-10-CM

## 2022-03-15 DIAGNOSIS — R40.0 DAYTIME SOMNOLENCE: ICD-10-CM

## 2022-03-15 DIAGNOSIS — Z98.84 HISTORY OF GASTRIC BYPASS: ICD-10-CM

## 2022-03-15 DIAGNOSIS — Z23 NEED FOR VACCINATION: ICD-10-CM

## 2022-03-15 PROBLEM — E66.9 OBESITY: Status: ACTIVE | Noted: 2022-03-15

## 2022-03-15 PROCEDURE — 90686 IIV4 VACC NO PRSV 0.5 ML IM: CPT | Performed by: NURSE PRACTITIONER

## 2022-03-15 PROCEDURE — 90471 IMMUNIZATION ADMIN: CPT | Performed by: NURSE PRACTITIONER

## 2022-03-15 PROCEDURE — 99214 OFFICE O/P EST MOD 30 MIN: CPT | Mod: 25 | Performed by: NURSE PRACTITIONER

## 2022-03-15 RX ORDER — MORPHINE SULFATE 15 MG/1
TABLET ORAL
COMMUNITY
End: 2022-03-15

## 2022-03-15 RX ORDER — OMEPRAZOLE 20 MG/1
1 CAPSULE, DELAYED RELEASE ORAL
COMMUNITY
End: 2022-03-15

## 2022-03-15 RX ORDER — ONDANSETRON 4 MG/1
TABLET, ORALLY DISINTEGRATING ORAL
COMMUNITY
End: 2022-03-15

## 2022-03-15 RX ORDER — DIAZEPAM 5 MG/1
TABLET ORAL
COMMUNITY
Start: 2022-01-21

## 2022-03-15 RX ORDER — SULFASALAZINE 500 MG/1
TABLET ORAL
COMMUNITY
End: 2022-03-15

## 2022-03-15 ASSESSMENT — FIBROSIS 4 INDEX: FIB4 SCORE: 0.61

## 2022-03-15 ASSESSMENT — PATIENT HEALTH QUESTIONNAIRE - PHQ9: CLINICAL INTERPRETATION OF PHQ2 SCORE: 0

## 2022-03-15 NOTE — ASSESSMENT & PLAN NOTE
New problem. Extreme fatigue x 3 mos and getting worse. Associated symptoms decreased libido, daytime sleepiness. No new medications. Hx of gastric bypass x 1 yr ago, eating healthy. Admits to not drinking enough water. Denies diarrhea. Sleeps well, no snoring. No CP, dyspnea, no GI issues. Denies depression, no ETOH.

## 2022-03-15 NOTE — PROGRESS NOTES
Chief Complaint   Patient presents with   • Follow-Up     Fatigue/having to take naps/effecting work       HISTORY OF PRESENT ILLNESS: Patient is a 36 y.o. male, established patient who presents today to discuss medical problems as listed below:    Health Maintenance:  COMPLETED     Chronic fatigue  New problem. Extreme fatigue x 3 mos and getting worse. Associated symptoms decreased libido, daytime sleepiness. No new medications. Hx of gastric bypass x 1 yr ago, eating healthy. Admits to not drinking enough water. Denies diarrhea. Sleeps well, no snoring. No CP, dyspnea, no GI issues. Denies depression, no ETOH.    Daytime somnolence  Excessive fatigue and daytime sleepiness and somnolence for the last 3 months, getting worse.  Denies muscle tingling or weakness, no hallucinations, has not been falling asleep and able to focus.  History of gastric bypass a year ago, on regular diet tolerating well.  No GI symptoms.      Patient Active Problem List    Diagnosis Date Noted   • Obesity 03/15/2022   • Daytime somnolence 03/15/2022   • Gastroesophageal reflux disease without esophagitis 09/24/2021   • Chronic fatigue 09/24/2021   • Acute left-sided low back pain without sciatica 05/06/2021   • Morbid obesity with BMI of 40.0-44.9, adult ([facility]) 07/19/2018   • Chronic use of opiate drug for therapeutic purpose 05/01/2015   • Elevated liver enzymes 05/01/2015   • Positive QuantiFERON-TB Gold test 05/01/2015   • Vitamin D deficiency 05/01/2015   • Wrist pain 10/30/2013   • Hand pain 10/30/2013   • Carpal tunnel syndrome 10/30/2013   • Hip pain 10/30/2013   • Shoulder pain 10/30/2013   • Low back pain 10/30/2013   • Rheumatoid arthritis ([facility]) 11/03/2009        Allergies: Nkda [no known drug allergy]    Current Outpatient Medications   Medication Sig Dispense Refill   • diazePAM (VALIUM) 5 MG Tab      • diclofenac sodium (VOLTAREN) 1 % Gel PLACE ONE GRAM ON THE SKIN THREE TIMES A  g 0   • BELBUCA 600 MCG FILM      •  "HYDROcodone-acetaminophen (NORCO) 5-325 MG Tab per tablet      • HUMIRA PEN 40 MG/0.4ML Pen-injector Kit 40 mg.     • gabapentin (NEURONTIN) 300 MG Cap Take 1 to 2 tablets by mouth every 8 hours as needed for nerve pain  Indications: Nerve Pain 180 Cap 5     No current facility-administered medications for this visit.       Social History     Tobacco Use   • Smoking status: Never Smoker   • Smokeless tobacco: Never Used   Vaping Use   • Vaping Use: Never used   Substance Use Topics   • Alcohol use: No   • Drug use: No     Social History     Social History Narrative   • Not on file       Family History   Problem Relation Age of Onset   • Hypertension Mother    • Hyperlipidemia Mother    • Heart Disease Maternal Grandfather 60        mi   • Diabetes Neg Hx    • Cancer Neg Hx        Allergies, past medical history, past surgical history, family history, social history reviewed and updated.    Review of Systems:     - Constitutional: extreme fatigue. Negative for fever, chills, unexpected weight change, andgeneralized weakness.     - Respiratory: Negative for cough, sputum production, chest congestion, dyspnea, wheezing, and crackles.      - Cardiovascular: Negative for chest pain, palpitations, orthopnea, and bilateral lower extremity edema.     - Gastrointestinal: Negative for heartburn, nausea, vomiting, abdominal pain, hematochezia, melena, diarrhea, constipation, and greasy/foul-smelling stools.     - Psychiatric/Behavioral: Negative for depression, suicidal/homicidal ideation and memory loss.      All other systems reviewed and are negative    Exam:    /82 (BP Location: Right arm, Patient Position: Sitting, BP Cuff Size: Adult)   Pulse 62   Temp 36.8 °C (98.2 °F) (Temporal)   Resp 12   Ht 1.651 m (5' 5\")   Wt 97.5 kg (215 lb)   SpO2 95%   BMI 35.78 kg/m²  Body mass index is 35.78 kg/m².    Physical Exam:  Constitutional: Well-developed and well-nourished. Not diaphoretic. No distress. "   Cardiovascular: Regular rate and rhythm, S1 and S2 without murmur, rubs, or gallops.    Chest: Effort normal. Clear to auscultation throughout. No adventitious sounds.   Neurological: Alert and oriented x 3.   Psychiatric:  Behavior, mood, and affect are appropriate.  MA/nursing note and vitals reviewed.    Assessment/Plan:  1. Need for vaccination  - INFLUENZA VACCINE QUAD INJ (PF)    2. Chronic fatigue  - CBC WITHOUT DIFFERENTIAL; Future  - Comp Metabolic Panel; Future  - VITAMIN D,25 HYDROXY; Future  - TSH; Future  - FREE THYROXINE; Future  - VITAMIN B12; Future  - IRON/TOTAL IRON BIND; Future  - FOLATE; Future  - FERRITIN; Future  - Testosterone, Free & Total, Adult Male (w/SHBG); Future  - Referral to Pulmonary and Sleep Medicine    3. Daytime somnolence  ddx electrolyte abnormalities, anemia, thyroid issue, narcolepsy?  We will obtain labs, will refer to sleep study.  - CBC WITHOUT DIFFERENTIAL; Future  - Comp Metabolic Panel; Future  - Lipid Profile; Future  - VITAMIN D,25 HYDROXY; Future  - TSH; Future  - FREE THYROXINE; Future  - VITAMIN B12; Future  - IRON/TOTAL IRON BIND; Future  - FOLATE; Future  - FERRITIN; Future  - Testosterone, Free & Total, Adult Male (w/SHBG); Future  - Referral to Pulmonary and Sleep Medicine    4. History of gastric bypass  - Comp Metabolic Panel; Future  - FREE THYROXINE; Future  - VITAMIN B12; Future  - IRON/TOTAL IRON BIND; Future  - FOLATE; Future  - FERRITIN; Future       Discussed with patient possible alternative diagnoses, pt is to take all medications as prescribed. If symptoms persist FU w/PCP, if symptoms worsen go to emergency room. If experiencing any side effects from prescribed medications reports to the office immediately or go to emergency room.  Reviewed indication, dosage, usage and potential adverse effects of prescribed medications. Reviewed risks and benefits of treatment plan. Patient verbalizes understanding of all instruction and verbally agrees to  plan.    No follow-ups on file.

## 2023-05-24 ENCOUNTER — OFFICE VISIT (OUTPATIENT)
Dept: MEDICAL GROUP | Facility: MEDICAL CENTER | Age: 38
End: 2023-05-24
Payer: COMMERCIAL

## 2023-05-24 VITALS
HEART RATE: 72 BPM | SYSTOLIC BLOOD PRESSURE: 116 MMHG | OXYGEN SATURATION: 96 % | HEIGHT: 65 IN | BODY MASS INDEX: 39.04 KG/M2 | TEMPERATURE: 97.8 F | DIASTOLIC BLOOD PRESSURE: 72 MMHG | RESPIRATION RATE: 16 BRPM | WEIGHT: 234.35 LBS

## 2023-05-24 DIAGNOSIS — K21.00 GASTROESOPHAGEAL REFLUX DISEASE WITH ESOPHAGITIS WITHOUT HEMORRHAGE: ICD-10-CM

## 2023-05-24 DIAGNOSIS — Z23 NEED FOR VACCINATION: ICD-10-CM

## 2023-05-24 DIAGNOSIS — R53.82 CHRONIC FATIGUE: ICD-10-CM

## 2023-05-24 DIAGNOSIS — Z00.00 PE (PHYSICAL EXAM), ANNUAL: ICD-10-CM

## 2023-05-24 DIAGNOSIS — B35.4 TINEA CORPORIS: ICD-10-CM

## 2023-05-24 DIAGNOSIS — R21 RASH OF FOOT: ICD-10-CM

## 2023-05-24 PROCEDURE — 3074F SYST BP LT 130 MM HG: CPT | Performed by: NURSE PRACTITIONER

## 2023-05-24 PROCEDURE — 90746 HEPB VACCINE 3 DOSE ADULT IM: CPT | Performed by: NURSE PRACTITIONER

## 2023-05-24 PROCEDURE — 3078F DIAST BP <80 MM HG: CPT | Performed by: NURSE PRACTITIONER

## 2023-05-24 PROCEDURE — 99214 OFFICE O/P EST MOD 30 MIN: CPT | Mod: 25 | Performed by: NURSE PRACTITIONER

## 2023-05-24 PROCEDURE — 90471 IMMUNIZATION ADMIN: CPT | Performed by: NURSE PRACTITIONER

## 2023-05-24 RX ORDER — CLOTRIMAZOLE AND BETAMETHASONE DIPROPIONATE 10; .64 MG/G; MG/G
1 CREAM TOPICAL 2 TIMES DAILY
Qty: 1 G | Refills: 1 | Status: SHIPPED
Start: 2023-05-24 | End: 2023-05-24

## 2023-05-24 RX ORDER — FLUCONAZOLE 150 MG/1
150 TABLET ORAL DAILY
Qty: 1 TABLET | Refills: 1 | Status: SHIPPED | OUTPATIENT
Start: 2023-05-24 | End: 2023-05-25

## 2023-05-24 RX ORDER — CLOTRIMAZOLE AND BETAMETHASONE DIPROPIONATE 10; .64 MG/G; MG/G
1 CREAM TOPICAL 2 TIMES DAILY
Qty: 45 G | Refills: 1 | Status: SHIPPED | OUTPATIENT
Start: 2023-05-24 | End: 2023-06-07

## 2023-05-24 RX ORDER — PANTOPRAZOLE SODIUM 20 MG/1
20 TABLET, DELAYED RELEASE ORAL DAILY
Qty: 90 TABLET | Refills: 0 | Status: SHIPPED | OUTPATIENT
Start: 2023-05-24 | End: 2023-08-16

## 2023-05-24 ASSESSMENT — PATIENT HEALTH QUESTIONNAIRE - PHQ9: CLINICAL INTERPRETATION OF PHQ2 SCORE: 0

## 2023-05-24 NOTE — ASSESSMENT & PLAN NOTE
This is a chronic problem, not getting patient reports extreme fatigue.  This gastric bypass 2 years ago.  Non-smoker.  Current issue today is uncontrolled acid reflux.  Otherwise denies depression, no ETOH.  Reports sporadic sleep.  Patient works for [email or web address] system and the schedule can be different depending on the week.  Generally sleeps 4 to 5 hours.

## 2023-05-24 NOTE — PROGRESS NOTES
Chief Complaint   Patient presents with    Rash     Left ankle    Fatigue       HISTORY OF PRESENT ILLNESS: Patient is a 38 y.o. male, established patient who presents today to discuss medical problems as listed below:    Health Maintenance:  COMPLETED       Allergies: Nkda [no known drug allergy]    Current Outpatient Medications   Medication Sig Dispense Refill    pantoprazole (PROTONIX) 20 MG tablet Take 1 Tablet by mouth every day. 90 Tablet 0    clotrimazole-betamethasone (LOTRISONE) 1-0.05 % Cream Apply 1 Application. topically 2 times a day. 1 g 1    diazePAM (VALIUM) 5 MG Tab       diclofenac sodium (VOLTAREN) 1 % Gel PLACE ONE GRAM ON THE SKIN THREE TIMES A  g 0    BELBUCA 600 MCG FILM       HYDROcodone-acetaminophen (NORCO) 5-325 MG Tab per tablet       HUMIRA PEN 40 MG/0.4ML Pen-injector Kit 40 mg.      gabapentin (NEURONTIN) 300 MG Cap Take 1 to 2 tablets by mouth every 8 hours as needed for nerve pain  Indications: Nerve Pain 180 Cap 5     No current facility-administered medications for this visit.       Allergies, past medical history, past surgical history, family history, social history reviewed and updated.    Review of Systems:     - Constitutional: fatigue. Negative for fever, chills, unexpected weight change, and generalized weakness.       - Respiratory: Negative for cough, sputum production, chest congestion, dyspnea, wheezing, and crackles.      - Cardiovascular: Negative for chest pain, palpitations, orthopnea, and bilateral lower extremity edema.     - Gastrointestinal: acid reflex. Negative for heartburn, nausea, vomiting, abdominal pain, hematochezia, melena, diarrhea, constipation, and greasy/foul-smelling stools.      - Psychiatric/Behavioral: Negative for depression, suicidal/homicidal ideation and memory loss.      All other systems reviewed and are negative    Exam:    /72 (BP Location: Left arm, Patient Position: Sitting, BP Cuff Size: Adult long)   Pulse 72   Temp  "36.6 °C (97.8 °F)   Resp 16   Ht 1.651 m (5' 5\")   Wt 106 kg (234 lb 5.6 oz)   SpO2 96%   BMI 39.00 kg/m²  Body mass index is 39 kg/m².    Physical Exam:  Constitutional: Well-developed and well-nourished. Not diaphoretic. No distress.   Skin: Noted lesion on the posterior lower aspect of the left foot.  Red circular lesion with small papules, appears eczematous.    Cardiovascular: Regular rate and rhythm, S1 and S2 without murmur, rubs, or gallops.    Chest: Effort normal. Clear to auscultation throughout. No adventitious sounds. Abdomen: Soft, non tender, and without distention. Active bowel sounds in all four quadrants. No rebound, guarding, masses or HSM.  Neurological: Alert and oriented x 3.   Psychiatric:  Behavior, mood, and affect are appropriate.  MA/nursing note and vitals reviewed.    Assessment/Plan:  Rash of foot  New problem.  Rash on the back of the left foot noted a few months ago, not improving.  Has tried hydration lotion without improvement.  Has not tried steroid over-the-counter point a fungal infection over-the-counter.  The rash is itching.     Gastroesophageal reflux disease with esophagitis without hemorrhage  New problem.  Patient reports moderate to severe symptoms of acid reflux with esophagitis.  Denies nausea or blood in stool.  Drinks 1 cup of coffee per day.  Avoiding triggering foods such as spicy foods.  Taking over-the-counter antiacid and times without improvement.    Chronic fatigue  This is a chronic problem, not getting patient reports extreme fatigue.  This gastric bypass 2 years ago.  Non-smoker.  Current issue today is uncontrolled acid reflux.  Otherwise denies depression, no ETOH.  Reports sporadic sleep.  Patient works for [employer] system and the schedule can be different depending on the week.  Generally sleeps 4 to 5 hours.     1. Rash of foot  Uncontrolled.  Suspecting tinea corporis and will treat with Lotrisone.  If not effective will start on Diflucan.  We will " follow-up in 2 weeks.  DDx includes but not limited to nummular eczema and psoriasis  - clotrimazole-betamethasone (LOTRISONE) 1-0.05 % Cream; Apply 1 Application. topically 2 times a day.  Dispense: 1 g; Refill: 1    2. Gastroesophageal reflux disease with esophagitis without hemorrhage  Uncontrolled.  Will test for H. pylori.  We will start on Protonix.  Discussed the importance of avoiding triggering foods acidic foods such as coffee and spicy foods.  Start drinking alkaline water we will follow-up in 2 weeks.  - H. PYLORI BREATH TEST  - pantoprazole (PROTONIX) 20 MG tablet; Take 1 Tablet by mouth every day.  Dispense: 90 Tablet; Refill: 0    3. Chronic fatigue  Uncontrolled.  Will obtain comprehensive labs including B12 as patient has history of gastric bypass.  Also will check for testosterone, diabetes, thyroid function electrolytes liver and kidney function.  We will follow-up next visit.  Also would like the patient to be evaluated for sleep apnea.  - CBC WITH DIFFERENTIAL; Future  - HEMOGLOBIN A1C; Future  - VITAMIN D,25 HYDROXY (DEFICIENCY); Future  - TSH; Future  - T3 FREE; Future  - FREE THYROXINE; Future  - VITAMIN B12; Future  - Testosterone, Free & Total, Adult Male (w/SHBG); Future  - Referral to Pulmonary and Sleep Medicine    4. PE (physical exam), annual  - CBC WITH DIFFERENTIAL; Future  - Comp Metabolic Panel; Future  - HEMOGLOBIN A1C; Future  - VITAMIN D,25 HYDROXY (DEFICIENCY); Future  - TSH; Future  - T3 FREE; Future  - Lipid Profile; Future  - FREE THYROXINE; Future  - VITAMIN B12; Future  - Testosterone, Free & Total, Adult Male (w/SHBG); Future    5. Need for vaccination  - Hepatitis B Vaccine Adult 20+    6. Tinea corporis  As discussed in 1  - clotrimazole-betamethasone (LOTRISONE) 1-0.05 % Cream; Apply 1 Application. topically 2 times a day.  Dispense: 1 g; Refill: 1      Discussed with patient possible alternative diagnoses, patient is to take all medications as prescribed.      If  symptoms persist FU w/PCP, if symptoms worsen go to emergency room.      If experiencing any side effects from prescribed medications report to the office immediately or go to emergency room.     Reviewed indication, dosage, usage and potential adverse effects of prescribed medications.      Reviewed risks and benefits of treatment plan. Patient verbalizes understanding of all instruction and verbally agrees to plan.     Discussed plan with the patient, and patient agrees to the above.      I personally reviewed prior external notes and test results pertinent to today's visit.      No follow-ups on file. 2 wks

## 2023-05-24 NOTE — ASSESSMENT & PLAN NOTE
New problem.  Rash on the back of the left foot noted a few months ago, not improving.  Has tried hydration lotion without improvement.  Has not tried steroid over-the-counter point a fungal infection over-the-counter.  The rash is itching.

## 2023-05-25 ENCOUNTER — HOSPITAL ENCOUNTER (OUTPATIENT)
Dept: LAB | Facility: MEDICAL CENTER | Age: 38
End: 2023-05-25
Attending: NURSE PRACTITIONER
Payer: COMMERCIAL

## 2023-05-25 DIAGNOSIS — Z00.00 PE (PHYSICAL EXAM), ANNUAL: ICD-10-CM

## 2023-05-25 DIAGNOSIS — R53.82 CHRONIC FATIGUE: ICD-10-CM

## 2023-05-25 LAB
25(OH)D3 SERPL-MCNC: 13 NG/ML (ref 30–100)
ALBUMIN SERPL BCP-MCNC: 4 G/DL (ref 3.2–4.9)
ALBUMIN/GLOB SERPL: 1.4 G/DL
ALP SERPL-CCNC: 93 U/L (ref 30–99)
ALT SERPL-CCNC: 66 U/L (ref 2–50)
ANION GAP SERPL CALC-SCNC: 13 MMOL/L (ref 7–16)
AST SERPL-CCNC: 36 U/L (ref 12–45)
BASOPHILS # BLD AUTO: 0.4 % (ref 0–1.8)
BASOPHILS # BLD: 0.03 K/UL (ref 0–0.12)
BILIRUB SERPL-MCNC: 0.5 MG/DL (ref 0.1–1.5)
BUN SERPL-MCNC: 12 MG/DL (ref 8–22)
CALCIUM ALBUM COR SERPL-MCNC: 9.4 MG/DL (ref 8.5–10.5)
CALCIUM SERPL-MCNC: 9.4 MG/DL (ref 8.5–10.5)
CHLORIDE SERPL-SCNC: 106 MMOL/L (ref 96–112)
CHOLEST SERPL-MCNC: 169 MG/DL (ref 100–199)
CO2 SERPL-SCNC: 24 MMOL/L (ref 20–33)
CREAT SERPL-MCNC: 0.8 MG/DL (ref 0.5–1.4)
EOSINOPHIL # BLD AUTO: 0.12 K/UL (ref 0–0.51)
EOSINOPHIL NFR BLD: 1.5 % (ref 0–6.9)
ERYTHROCYTE [DISTWIDTH] IN BLOOD BY AUTOMATED COUNT: 39.6 FL (ref 35.9–50)
EST. AVERAGE GLUCOSE BLD GHB EST-MCNC: 126 MG/DL
FASTING STATUS PATIENT QL REPORTED: NORMAL
GFR SERPLBLD CREATININE-BSD FMLA CKD-EPI: 118 ML/MIN/1.73 M 2
GLOBULIN SER CALC-MCNC: 2.9 G/DL (ref 1.9–3.5)
GLUCOSE SERPL-MCNC: 92 MG/DL (ref 65–99)
HBA1C MFR BLD: 6 % (ref 4–5.6)
HCT VFR BLD AUTO: 48.4 % (ref 42–52)
HDLC SERPL-MCNC: 60 MG/DL
HGB BLD-MCNC: 15.9 G/DL (ref 14–18)
IMM GRANULOCYTES # BLD AUTO: 0.03 K/UL (ref 0–0.11)
IMM GRANULOCYTES NFR BLD AUTO: 0.4 % (ref 0–0.9)
LDLC SERPL CALC-MCNC: 96 MG/DL
LYMPHOCYTES # BLD AUTO: 1.94 K/UL (ref 1–4.8)
LYMPHOCYTES NFR BLD: 24.8 % (ref 22–41)
MCH RBC QN AUTO: 26.9 PG (ref 27–33)
MCHC RBC AUTO-ENTMCNC: 32.9 G/DL (ref 32.3–36.5)
MCV RBC AUTO: 82 FL (ref 81.4–97.8)
MONOCYTES # BLD AUTO: 0.52 K/UL (ref 0–0.85)
MONOCYTES NFR BLD AUTO: 6.6 % (ref 0–13.4)
NEUTROPHILS # BLD AUTO: 5.19 K/UL (ref 1.82–7.42)
NEUTROPHILS NFR BLD: 66.3 % (ref 44–72)
NRBC # BLD AUTO: 0 K/UL
NRBC BLD-RTO: 0 /100 WBC (ref 0–0.2)
PLATELET # BLD AUTO: 250 K/UL (ref 164–446)
PMV BLD AUTO: 9.7 FL (ref 9–12.9)
POTASSIUM SERPL-SCNC: 4.1 MMOL/L (ref 3.6–5.5)
PROT SERPL-MCNC: 6.9 G/DL (ref 6–8.2)
RBC # BLD AUTO: 5.9 M/UL (ref 4.7–6.1)
SODIUM SERPL-SCNC: 143 MMOL/L (ref 135–145)
T3FREE SERPL-MCNC: 3.46 PG/ML (ref 2–4.4)
T4 FREE SERPL-MCNC: 1.6 NG/DL (ref 0.93–1.7)
TRIGL SERPL-MCNC: 66 MG/DL (ref 0–149)
TSH SERPL DL<=0.005 MIU/L-ACNC: 0.55 UIU/ML (ref 0.38–5.33)
VIT B12 SERPL-MCNC: 1069 PG/ML (ref 211–911)
WBC # BLD AUTO: 7.8 K/UL (ref 4.8–10.8)

## 2023-05-25 PROCEDURE — 83013 H PYLORI (C-13) BREATH: CPT

## 2023-05-25 PROCEDURE — 84439 ASSAY OF FREE THYROXINE: CPT

## 2023-05-25 PROCEDURE — 36415 COLL VENOUS BLD VENIPUNCTURE: CPT

## 2023-05-25 PROCEDURE — 84443 ASSAY THYROID STIM HORMONE: CPT

## 2023-05-25 PROCEDURE — 84403 ASSAY OF TOTAL TESTOSTERONE: CPT

## 2023-05-25 PROCEDURE — 84481 FREE ASSAY (FT-3): CPT

## 2023-05-25 PROCEDURE — 80053 COMPREHEN METABOLIC PANEL: CPT

## 2023-05-25 PROCEDURE — 84402 ASSAY OF FREE TESTOSTERONE: CPT

## 2023-05-25 PROCEDURE — 82607 VITAMIN B-12: CPT

## 2023-05-25 PROCEDURE — 82306 VITAMIN D 25 HYDROXY: CPT

## 2023-05-25 PROCEDURE — 80061 LIPID PANEL: CPT

## 2023-05-25 PROCEDURE — 84270 ASSAY OF SEX HORMONE GLOBUL: CPT

## 2023-05-25 PROCEDURE — 83036 HEMOGLOBIN GLYCOSYLATED A1C: CPT

## 2023-05-25 PROCEDURE — 85025 COMPLETE CBC W/AUTO DIFF WBC: CPT

## 2023-05-26 LAB — UREA BREATH TEST QL: NEGATIVE

## 2023-05-27 LAB
SHBG SERPL-SCNC: 49 NMOL/L (ref 17–56)
TESTOST FREE MFR SERPL: 1.5 % (ref 1.6–2.9)
TESTOST FREE SERPL-MCNC: 96 PG/ML (ref 47–244)
TESTOST SERPL-MCNC: 623 NG/DL (ref 300–1080)

## 2023-06-01 ENCOUNTER — TELEPHONE (OUTPATIENT)
Dept: HEALTH INFORMATION MANAGEMENT | Facility: OTHER | Age: 38
End: 2023-06-01
Payer: COMMERCIAL

## 2023-06-07 ENCOUNTER — OFFICE VISIT (OUTPATIENT)
Dept: MEDICAL GROUP | Facility: MEDICAL CENTER | Age: 38
End: 2023-06-07
Payer: COMMERCIAL

## 2023-06-07 VITALS
HEIGHT: 65 IN | RESPIRATION RATE: 16 BRPM | WEIGHT: 233.69 LBS | SYSTOLIC BLOOD PRESSURE: 118 MMHG | DIASTOLIC BLOOD PRESSURE: 72 MMHG | TEMPERATURE: 96.9 F | OXYGEN SATURATION: 97 % | BODY MASS INDEX: 38.93 KG/M2 | HEART RATE: 63 BPM

## 2023-06-07 DIAGNOSIS — R74.01 ELEVATED ALT MEASUREMENT: ICD-10-CM

## 2023-06-07 DIAGNOSIS — E55.9 VITAMIN D DEFICIENCY: ICD-10-CM

## 2023-06-07 DIAGNOSIS — R21 RASH OF FOOT: ICD-10-CM

## 2023-06-07 DIAGNOSIS — R73.03 PREDIABETES: ICD-10-CM

## 2023-06-07 PROCEDURE — 3074F SYST BP LT 130 MM HG: CPT | Performed by: NURSE PRACTITIONER

## 2023-06-07 PROCEDURE — 3078F DIAST BP <80 MM HG: CPT | Performed by: NURSE PRACTITIONER

## 2023-06-07 PROCEDURE — 99214 OFFICE O/P EST MOD 30 MIN: CPT | Performed by: NURSE PRACTITIONER

## 2023-06-07 RX ORDER — TRIAMCINOLONE ACETONIDE 1 MG/G
1 OINTMENT TOPICAL 2 TIMES DAILY
Qty: 80 G | Refills: 1 | Status: SHIPPED | OUTPATIENT
Start: 2023-06-07

## 2023-06-07 RX ORDER — BUPRENORPHINE HYDROCHLORIDE 750 UG/1
FILM, SOLUBLE BUCCAL
COMMUNITY
Start: 2023-05-30

## 2023-06-07 ASSESSMENT — FIBROSIS 4 INDEX: FIB4 SCORE: 0.64

## 2023-06-08 NOTE — ASSESSMENT & PLAN NOTE
Latest Reference Range & Units 05/25/23 10:51   Glycohemoglobin 4.0 - 5.6 % 6.0 (H)   (H): Data is abnormally high  Patient admits to carbohydrate and starch intake is active, works out and lifting weights

## 2023-06-08 NOTE — ASSESSMENT & PLAN NOTE
Latest Reference Range & Units 05/25/23 10:51   25-Hydroxy   Vitamin D 25 30 - 100 ng/mL 13 (L)   (L): Data is abnormally low  Currently not supplementing.  Will start taking OTC vitamin D.

## 2023-06-08 NOTE — ASSESSMENT & PLAN NOTE
Latest Reference Range & Units 05/25/23 10:51   AST(SGOT) 12 - 45 U/L 36   ALT(SGPT) 2 - 50 U/L 66 (H)   (H): Data is abnormally high  No alcohol rare NSAIDs.  Patient recently completed antiviral prescription regimen.  Abdominal pain right RUQ tenderness

## 2023-06-08 NOTE — ASSESSMENT & PLAN NOTE
Chronic persistent problem.  Patient reports rash on posterior lower left leg.  Last visit treated with antifungal medicines, no improvement felt.

## 2023-06-08 NOTE — PROGRESS NOTES
"Chief Complaint   Patient presents with    Follow-Up       HISTORY OF PRESENT ILLNESS: Patient is a 38 y.o. male, established patient who presents today to discuss medical problems as listed below:    Health Maintenance:  COMPLETED       Allergies: Nkda [no known drug allergy]    Current Outpatient Medications   Medication Sig Dispense Refill    triamcinolone acetonide (KENALOG) 0.1 % Ointment Apply 1 Application. topically 2 times a day. 80 g 1    BELBUCA 750 MCG FILM       pantoprazole (PROTONIX) 20 MG tablet Take 1 Tablet by mouth every day. 90 Tablet 0    clotrimazole-betamethasone (LOTRISONE) 1-0.05 % Cream Apply 1 Application. topically 2 times a day for 14 days. 45 g 1    diazePAM (VALIUM) 5 MG Tab       diclofenac sodium (VOLTAREN) 1 % Gel PLACE ONE GRAM ON THE SKIN THREE TIMES A  g 0    BELBUCA 600 MCG FILM       HYDROcodone-acetaminophen (NORCO) 5-325 MG Tab per tablet       HUMIRA PEN 40 MG/0.4ML Pen-injector Kit 40 mg.      gabapentin (NEURONTIN) 300 MG Cap Take 1 to 2 tablets by mouth every 8 hours as needed for nerve pain  Indications: Nerve Pain 180 Cap 5     No current facility-administered medications for this visit.       Allergies, past medical history, past surgical history, family history, social history reviewed and updated.    Review of Systems:     - Constitutional: Negative for fever, chills, unexpected weight change, and fatigue/generalized weakness.     - Respiratory: Negative for cough, sputum production, chest congestion, dyspnea, wheezing, and crackles.      - Cardiovascular: Negative for chest pain, palpitations, orthopnea, and bilateral lower extremity edema.      - Psychiatric/Behavioral: Negative for depression, suicidal/homicidal ideation and memory loss.      All other systems reviewed and are negative    Exam:    /72   Pulse 63   Temp 36.1 °C (96.9 °F) (Temporal)   Resp 16   Ht 1.651 m (5' 5\")   Wt 106 kg (233 lb 11 oz)   SpO2 97%   BMI 38.89 kg/m²  Body mass " index is 38.89 kg/m².    Physical Exam:  Constitutional: Well-developed and well-nourished. Not diaphoretic. No distress.   Cardiovascular: Regular rate and rhythm, S1 and S2 without murmur, rubs, or gallops.    Chest: Effort normal. Clear to auscultation throughout. No adventitious sounds. Neurological: Alert and oriented x 3.  Psychiatric:  Behavior, mood, and affect are appropriate.  MA/nursing note and vitals reviewed.    LABS: 5/2023  results reviewed and discussed with the patient, questions answered.    Assessment/Plan:  Rash of foot  Chronic persistent problem.  Patient reports rash on posterior lower left leg.  Last visit treated with antifungal medicines, no improvement felt.    Elevated ALT measurement   Latest Reference Range & Units 05/25/23 10:51   AST(SGOT) 12 - 45 U/L 36   ALT(SGPT) 2 - 50 U/L 66 (H)   (H): Data is abnormally high  No alcohol rare NSAIDs.  Patient recently completed antiviral prescription regimen.  Abdominal pain right RUQ tenderness    Vitamin D deficiency   Latest Reference Range & Units 05/25/23 10:51   25-Hydroxy   Vitamin D 25 30 - 100 ng/mL 13 (L)   (L): Data is abnormally low  Currently not supplementing.  Will start taking OTC vitamin D.    Prediabetes   Latest Reference Range & Units 05/25/23 10:51   Glycohemoglobin 4.0 - 5.6 % 6.0 (H)   (H): Data is abnormally high  Patient admits to carbohydrate and starch intake is active, works out and lifting weights    1. Rash of foot  Uncontrolled.  Trial of topical Kenalog.    - triamcinolone acetonide (KENALOG) 0.1 % Ointment; Apply 1 Application. topically 2 times a day.  Dispense: 80 g; Refill: 1    2. Elevated ALT measurement  Secondary to recent antifungal regimen.    3. Vitamin D deficiency  Optimization patient will take OTC 5000 IUs daily.  Take it with food for better absorption.    4. Prediabetes  Discussed the importance of healthy nutrition on continue daily activity.  Avoid simple carbohydrates and starches.  Focus on  healthy proteins, fiber and healthy fats.  We will follow-up in the next 6 to 12 months.      Discussed with patient possible alternative diagnoses, patient is to take all medications as prescribed.      If symptoms persist FU w/PCP, if symptoms worsen go to emergency room.      If experiencing any side effects from prescribed medications report to the office immediately or go to emergency room.     Reviewed indication, dosage, usage and potential adverse effects of prescribed medications.      Reviewed risks and benefits of treatment plan. Patient verbalizes understanding of all instruction and verbally agrees to plan.     Discussed plan with the patient, and patient agrees to the above.      I personally reviewed prior external notes and test results pertinent to today's visit.      No follow-ups on file. 6-12 mos

## 2023-08-16 DIAGNOSIS — K21.00 GASTROESOPHAGEAL REFLUX DISEASE WITH ESOPHAGITIS WITHOUT HEMORRHAGE: ICD-10-CM

## 2023-08-16 RX ORDER — PANTOPRAZOLE SODIUM 20 MG/1
20 TABLET, DELAYED RELEASE ORAL DAILY
Qty: 90 TABLET | Refills: 0 | Status: SHIPPED | OUTPATIENT
Start: 2023-08-16 | End: 2023-11-16

## 2023-11-16 ENCOUNTER — HOSPITAL ENCOUNTER (OUTPATIENT)
Dept: RADIOLOGY | Facility: MEDICAL CENTER | Age: 38
End: 2023-11-16
Attending: INTERNAL MEDICINE
Payer: COMMERCIAL

## 2023-11-16 ENCOUNTER — HOSPITAL ENCOUNTER (OUTPATIENT)
Dept: LAB | Facility: MEDICAL CENTER | Age: 38
End: 2023-11-16
Attending: INTERNAL MEDICINE
Payer: COMMERCIAL

## 2023-11-16 DIAGNOSIS — M79.642 LEFT HAND PAIN: ICD-10-CM

## 2023-11-16 DIAGNOSIS — K21.00 GASTROESOPHAGEAL REFLUX DISEASE WITH ESOPHAGITIS WITHOUT HEMORRHAGE: ICD-10-CM

## 2023-11-16 DIAGNOSIS — M79.641 RIGHT HAND PAIN: ICD-10-CM

## 2023-11-16 DIAGNOSIS — M79.672 LEFT FOOT PAIN: ICD-10-CM

## 2023-11-16 DIAGNOSIS — M79.671 RIGHT FOOT PAIN: ICD-10-CM

## 2023-11-16 LAB
ALBUMIN SERPL BCP-MCNC: 4 G/DL (ref 3.2–4.9)
ALT SERPL-CCNC: 61 U/L (ref 2–50)
AST SERPL-CCNC: 45 U/L (ref 12–45)
BASOPHILS # BLD AUTO: 0.4 % (ref 0–1.8)
BASOPHILS # BLD: 0.03 K/UL (ref 0–0.12)
CREAT SERPL-MCNC: 0.81 MG/DL (ref 0.5–1.4)
EOSINOPHIL # BLD AUTO: 0.16 K/UL (ref 0–0.51)
EOSINOPHIL NFR BLD: 2.1 % (ref 0–6.9)
ERYTHROCYTE [DISTWIDTH] IN BLOOD BY AUTOMATED COUNT: 40 FL (ref 35.9–50)
ERYTHROCYTE [SEDIMENTATION RATE] IN BLOOD BY WESTERGREN METHOD: 4 MM/HOUR (ref 0–20)
GFR SERPLBLD CREATININE-BSD FMLA CKD-EPI: 117 ML/MIN/1.73 M 2
HBV CORE AB SERPL QL IA: NONREACTIVE
HBV SURFACE AB SERPL IA-ACNC: 246 MIU/ML (ref 0–10)
HBV SURFACE AG SER QL: NORMAL
HCT VFR BLD AUTO: 44.3 % (ref 42–52)
HGB BLD-MCNC: 14.7 G/DL (ref 14–18)
IMM GRANULOCYTES # BLD AUTO: 0.01 K/UL (ref 0–0.11)
IMM GRANULOCYTES NFR BLD AUTO: 0.1 % (ref 0–0.9)
LYMPHOCYTES # BLD AUTO: 2.78 K/UL (ref 1–4.8)
LYMPHOCYTES NFR BLD: 35.7 % (ref 22–41)
MCH RBC QN AUTO: 26.7 PG (ref 27–33)
MCHC RBC AUTO-ENTMCNC: 33.2 G/DL (ref 32.3–36.5)
MCV RBC AUTO: 80.4 FL (ref 81.4–97.8)
MONOCYTES # BLD AUTO: 0.56 K/UL (ref 0–0.85)
MONOCYTES NFR BLD AUTO: 7.2 % (ref 0–13.4)
NEUTROPHILS # BLD AUTO: 4.24 K/UL (ref 1.82–7.42)
NEUTROPHILS NFR BLD: 54.5 % (ref 44–72)
NRBC # BLD AUTO: 0 K/UL
NRBC BLD-RTO: 0 /100 WBC (ref 0–0.2)
PLATELET # BLD AUTO: 285 K/UL (ref 164–446)
PMV BLD AUTO: 9.9 FL (ref 9–12.9)
RBC # BLD AUTO: 5.51 M/UL (ref 4.7–6.1)
WBC # BLD AUTO: 7.8 K/UL (ref 4.8–10.8)

## 2023-11-16 PROCEDURE — 87340 HEPATITIS B SURFACE AG IA: CPT

## 2023-11-16 PROCEDURE — 85652 RBC SED RATE AUTOMATED: CPT

## 2023-11-16 PROCEDURE — 86706 HEP B SURFACE ANTIBODY: CPT

## 2023-11-16 PROCEDURE — 86704 HEP B CORE ANTIBODY TOTAL: CPT

## 2023-11-16 PROCEDURE — 84460 ALANINE AMINO (ALT) (SGPT): CPT

## 2023-11-16 PROCEDURE — 86480 TB TEST CELL IMMUN MEASURE: CPT

## 2023-11-16 PROCEDURE — 85025 COMPLETE CBC W/AUTO DIFF WBC: CPT

## 2023-11-16 PROCEDURE — 77077 JOINT SURVEY SINGLE VIEW: CPT

## 2023-11-16 PROCEDURE — 82040 ASSAY OF SERUM ALBUMIN: CPT

## 2023-11-16 PROCEDURE — 36415 COLL VENOUS BLD VENIPUNCTURE: CPT

## 2023-11-16 PROCEDURE — 84450 TRANSFERASE (AST) (SGOT): CPT

## 2023-11-16 PROCEDURE — 82565 ASSAY OF CREATININE: CPT

## 2023-11-16 RX ORDER — PANTOPRAZOLE SODIUM 20 MG/1
20 TABLET, DELAYED RELEASE ORAL DAILY
Qty: 90 TABLET | Refills: 0 | Status: SHIPPED | OUTPATIENT
Start: 2023-11-16 | End: 2024-02-27

## 2023-11-17 LAB
GAMMA INTERFERON BACKGROUND BLD IA-ACNC: 0.14 IU/ML
M TB IFN-G BLD-IMP: NEGATIVE
M TB IFN-G CD4+ BCKGRND COR BLD-ACNC: 0.17 IU/ML
MITOGEN IGNF BCKGRD COR BLD-ACNC: >10 IU/ML
QFT TB2 - NIL TBQ2: 0.23 IU/ML

## 2024-02-25 DIAGNOSIS — K21.00 GASTROESOPHAGEAL REFLUX DISEASE WITH ESOPHAGITIS WITHOUT HEMORRHAGE: ICD-10-CM

## 2024-02-26 NOTE — TELEPHONE ENCOUNTER
Received request via: Pharmacy    Was the patient seen in the last year in this department? Yes    Does the patient have an active prescription (recently filled or refills available) for medication(s) requested? No    [facility]    Does the patient have care home Plus and need 100 day supply (blood pressure, diabetes and cholesterol meds only)? Patient does not have SCP  
yes

## 2024-02-27 RX ORDER — PANTOPRAZOLE SODIUM 20 MG/1
20 TABLET, DELAYED RELEASE ORAL DAILY
Qty: 30 TABLET | Refills: 0 | Status: SHIPPED | OUTPATIENT
Start: 2024-02-27

## 2024-03-22 ENCOUNTER — EMPLOYEE HEALTH (OUTPATIENT)
Dept: OCCUPATIONAL MEDICINE | Facility: CLINIC | Age: 39
End: 2024-03-22

## 2024-03-22 ENCOUNTER — EH NON-PROVIDER (OUTPATIENT)
Dept: OCCUPATIONAL MEDICINE | Facility: CLINIC | Age: 39
End: 2024-03-22

## 2024-03-22 ENCOUNTER — HOSPITAL ENCOUNTER (OUTPATIENT)
Facility: MEDICAL CENTER | Age: 39
End: 2024-03-22
Attending: NURSE PRACTITIONER
Payer: COMMERCIAL

## 2024-03-22 DIAGNOSIS — Z02.89 ENCOUNTER FOR OCCUPATIONAL HEALTH ASSESSMENT: ICD-10-CM

## 2024-03-22 DIAGNOSIS — Z02.1 PRE-EMPLOYMENT DRUG SCREENING: ICD-10-CM

## 2024-03-22 LAB
AMP AMPHETAMINE: NORMAL
BAR BARBITURATES: NORMAL
BZO BENZODIAZEPINES: NORMAL
COC COCAINE: NORMAL
INT CON NEG: NORMAL
INT CON POS: NORMAL
MDMA ECSTASY: NORMAL
MET METHAMPHETAMINES: NORMAL
MTD METHADONE: NORMAL
OPI OPIATES: NORMAL
OXY OXYCODONE: NORMAL
PCP PHENCYCLIDINE: NORMAL
POC URINE DRUG SCREEN OCDRS: NORMAL
THC: NORMAL

## 2024-03-22 PROCEDURE — 8915 PR COMPREHENSIVE PHYSICAL: Performed by: PREVENTIVE MEDICINE

## 2024-03-22 PROCEDURE — 86787 VARICELLA-ZOSTER ANTIBODY: CPT | Performed by: NURSE PRACTITIONER

## 2024-03-22 PROCEDURE — 86765 RUBEOLA ANTIBODY: CPT | Performed by: NURSE PRACTITIONER

## 2024-03-22 PROCEDURE — 86735 MUMPS ANTIBODY: CPT | Performed by: NURSE PRACTITIONER

## 2024-03-22 PROCEDURE — 80305 DRUG TEST PRSMV DIR OPT OBS: CPT | Performed by: NURSE PRACTITIONER

## 2024-03-22 PROCEDURE — 86762 RUBELLA ANTIBODY: CPT | Performed by: NURSE PRACTITIONER

## 2024-03-22 NOTE — PROGRESS NOTES
DS complete  QTF 11/2023 in epic  Hep B titer in epic (immune)  No mask fit per JENNY  MMR, VZV titers drawn

## 2024-03-24 LAB
MEV IGG SER-ACNC: 51.7 AU/ML
MUV IGG SER IA-ACNC: 37.4 AU/ML
RUBV AB SER QL: 162 IU/ML
VZV IGG SER IA-ACNC: 439.3 IV

## 2024-04-01 DIAGNOSIS — K21.00 GASTROESOPHAGEAL REFLUX DISEASE WITH ESOPHAGITIS WITHOUT HEMORRHAGE: ICD-10-CM

## 2024-04-01 NOTE — TELEPHONE ENCOUNTER
Received request via: Pharmacy    Was the patient seen in the last year in this department? Yes    Does the patient have an active prescription (recently filled or refills available) for medication(s) requested? No        Does the patient have long-term Plus and need 100 day supply (blood pressure, diabetes and cholesterol meds only)? Patient does not have SCP

## 2024-04-02 RX ORDER — PANTOPRAZOLE SODIUM 20 MG/1
20 TABLET, DELAYED RELEASE ORAL DAILY
Qty: 30 TABLET | Refills: 0 | Status: SHIPPED | OUTPATIENT
Start: 2024-04-02

## 2024-04-07 ENCOUNTER — APPOINTMENT (OUTPATIENT)
Dept: URGENT CARE | Facility: CLINIC | Age: 39
End: 2024-04-07
Payer: COMMERCIAL

## 2024-05-04 DIAGNOSIS — K21.00 GASTROESOPHAGEAL REFLUX DISEASE WITH ESOPHAGITIS WITHOUT HEMORRHAGE: ICD-10-CM

## 2024-05-07 RX ORDER — PANTOPRAZOLE SODIUM 20 MG/1
20 TABLET, DELAYED RELEASE ORAL DAILY
Qty: 30 TABLET | Refills: 0 | Status: SHIPPED | OUTPATIENT
Start: 2024-05-07

## 2024-06-04 ENCOUNTER — HOSPITAL ENCOUNTER (OUTPATIENT)
Dept: LAB | Facility: MEDICAL CENTER | Age: 39
End: 2024-06-04
Attending: INTERNAL MEDICINE
Payer: COMMERCIAL

## 2024-06-04 LAB
ALBUMIN SERPL BCP-MCNC: 4.2 G/DL (ref 3.2–4.9)
ALT SERPL-CCNC: 74 U/L (ref 2–50)
AST SERPL-CCNC: 49 U/L (ref 12–45)
BASOPHILS # BLD AUTO: 0.3 % (ref 0–1.8)
BASOPHILS # BLD: 0.02 K/UL (ref 0–0.12)
CREAT SERPL-MCNC: 0.74 MG/DL (ref 0.5–1.4)
EOSINOPHIL # BLD AUTO: 0.12 K/UL (ref 0–0.51)
EOSINOPHIL NFR BLD: 1.9 % (ref 0–6.9)
ERYTHROCYTE [DISTWIDTH] IN BLOOD BY AUTOMATED COUNT: 39.7 FL (ref 35.9–50)
ERYTHROCYTE [SEDIMENTATION RATE] IN BLOOD BY WESTERGREN METHOD: 4 MM/HOUR (ref 0–20)
GFR SERPLBLD CREATININE-BSD FMLA CKD-EPI: 120 ML/MIN/1.73 M 2
HCT VFR BLD AUTO: 49 % (ref 42–52)
HGB BLD-MCNC: 16.2 G/DL (ref 14–18)
IMM GRANULOCYTES # BLD AUTO: 0.01 K/UL (ref 0–0.11)
IMM GRANULOCYTES NFR BLD AUTO: 0.2 % (ref 0–0.9)
LYMPHOCYTES # BLD AUTO: 1.8 K/UL (ref 1–4.8)
LYMPHOCYTES NFR BLD: 27.9 % (ref 22–41)
MCH RBC QN AUTO: 26.7 PG (ref 27–33)
MCHC RBC AUTO-ENTMCNC: 33.1 G/DL (ref 32.3–36.5)
MCV RBC AUTO: 80.7 FL (ref 81.4–97.8)
MONOCYTES # BLD AUTO: 0.42 K/UL (ref 0–0.85)
MONOCYTES NFR BLD AUTO: 6.5 % (ref 0–13.4)
NEUTROPHILS # BLD AUTO: 4.09 K/UL (ref 1.82–7.42)
NEUTROPHILS NFR BLD: 63.2 % (ref 44–72)
NRBC # BLD AUTO: 0 K/UL
NRBC BLD-RTO: 0 /100 WBC (ref 0–0.2)
PLATELET # BLD AUTO: 241 K/UL (ref 164–446)
PMV BLD AUTO: 10.2 FL (ref 9–12.9)
RBC # BLD AUTO: 6.07 M/UL (ref 4.7–6.1)
WBC # BLD AUTO: 6.5 K/UL (ref 4.8–10.8)

## 2024-06-04 PROCEDURE — 36415 COLL VENOUS BLD VENIPUNCTURE: CPT

## 2024-06-04 PROCEDURE — 82565 ASSAY OF CREATININE: CPT

## 2024-06-04 PROCEDURE — 84460 ALANINE AMINO (ALT) (SGPT): CPT

## 2024-06-04 PROCEDURE — 85652 RBC SED RATE AUTOMATED: CPT

## 2024-06-04 PROCEDURE — 85025 COMPLETE CBC W/AUTO DIFF WBC: CPT

## 2024-06-04 PROCEDURE — 82040 ASSAY OF SERUM ALBUMIN: CPT

## 2024-06-04 PROCEDURE — 84450 TRANSFERASE (AST) (SGOT): CPT

## 2024-09-20 ENCOUNTER — OFFICE VISIT (OUTPATIENT)
Dept: MEDICAL GROUP | Facility: LAB | Age: 39
End: 2024-09-20
Payer: COMMERCIAL

## 2024-09-20 ENCOUNTER — APPOINTMENT (OUTPATIENT)
Dept: MEDICAL GROUP | Facility: MEDICAL CENTER | Age: 39
End: 2024-09-20
Payer: COMMERCIAL

## 2024-09-20 VITALS
TEMPERATURE: 97.4 F | DIASTOLIC BLOOD PRESSURE: 70 MMHG | RESPIRATION RATE: 16 BRPM | BODY MASS INDEX: 40.65 KG/M2 | SYSTOLIC BLOOD PRESSURE: 122 MMHG | OXYGEN SATURATION: 97 % | HEIGHT: 65 IN | HEART RATE: 63 BPM | WEIGHT: 244 LBS

## 2024-09-20 DIAGNOSIS — K21.9 GASTROESOPHAGEAL REFLUX DISEASE, UNSPECIFIED WHETHER ESOPHAGITIS PRESENT: ICD-10-CM

## 2024-09-20 DIAGNOSIS — Z98.84 HISTORY OF GASTRIC BYPASS: ICD-10-CM

## 2024-09-20 PROBLEM — K21.01 GASTROESOPHAGEAL REFLUX DISEASE WITH ESOPHAGITIS AND HEMORRHAGE: Status: ACTIVE | Noted: 2023-05-24

## 2024-09-20 PROCEDURE — 3074F SYST BP LT 130 MM HG: CPT | Performed by: STUDENT IN AN ORGANIZED HEALTH CARE EDUCATION/TRAINING PROGRAM

## 2024-09-20 PROCEDURE — 99214 OFFICE O/P EST MOD 30 MIN: CPT | Performed by: STUDENT IN AN ORGANIZED HEALTH CARE EDUCATION/TRAINING PROGRAM

## 2024-09-20 PROCEDURE — 3078F DIAST BP <80 MM HG: CPT | Performed by: STUDENT IN AN ORGANIZED HEALTH CARE EDUCATION/TRAINING PROGRAM

## 2024-09-20 RX ORDER — PANTOPRAZOLE SODIUM 40 MG/1
40 TABLET, DELAYED RELEASE ORAL DAILY
Qty: 90 TABLET | Refills: 3 | Status: SHIPPED | OUTPATIENT
Start: 2024-09-20

## 2024-09-20 RX ORDER — MEDROXYPROGESTERONE ACETATE 150 MG/ML
50 INJECTION, SUSPENSION INTRAMUSCULAR
COMMUNITY
Start: 2024-09-18

## 2024-09-20 ASSESSMENT — ENCOUNTER SYMPTOMS
VOMITING: 0
WEIGHT LOSS: 0
CHILLS: 0
SHORTNESS OF BREATH: 0
ABDOMINAL PAIN: 0
HEARTBURN: 1
PALPITATIONS: 0
BLOOD IN STOOL: 0
COUGH: 0
DIARRHEA: 0
NAUSEA: 0
DIZZINESS: 0
FEVER: 0

## 2024-09-20 ASSESSMENT — PATIENT HEALTH QUESTIONNAIRE - PHQ9: CLINICAL INTERPRETATION OF PHQ2 SCORE: 0

## 2024-09-20 ASSESSMENT — FIBROSIS 4 INDEX: FIB4 SCORE: 0.87

## 2024-09-20 NOTE — PATIENT INSTRUCTIONS
Please avoid NSAIDs (Non-Steroid Anti-Inflammatory Drugs), such as ibuprofen (brand names: Motrin, Advil), naproxen (brand name: Aleve), celecoxib (brand name: Celebrex), or even prescription NSAIDs such as meloxicam (brand name: Mobic), diclofenac (brand name: Voltaren), ketorolac (brand name: Toradol), or indomethacin (brand name: Indocin). Avoid aspirin.     Try 40mg daily, may increase to 40mg twice a day or protonix. Can take pepcid with this.

## 2024-09-20 NOTE — PROGRESS NOTES
Verbal consent was acquired by the patient to use [employer] ambient listening note generation during this visit Yes.    Subjective:     Chief Complaint   Patient presents with    Heartburn     Severe heartburn      HPI:   Cooper is a 39 y.o. male who presents today with severe heartburn PCP unavailable.    History of Present Illness  The patient is a 39-year-old male presenting with severe heartburn.    He underwent gastric bypass surgery in 2022. Since then, his acid reflux has progressively worsened, almost causing him to spit up blood at times (small amounts if he takes tums, more if not). This issue began within the last year and has been escalating. He describes the sensation as if his esophagus is on fire, leading to spitting. If he does not take Tums, he begins to spit blood. This has been ongoing for at least a year. Despite using over-the-counter medications (Tums/pepcid), he struggles with sleep due to the discomfort. He consumes a bottle of Tums every two days due to the severity of his condition. He was previously prescribed medication that provided some relief, but he requires to be seen for further prescriptions. He was prescribed pantoprazole 20mg daily prior, but with break thru symptoms. His symptoms have worsened since his last labs in 06/2024. His symptoms are most severe at night, often waking him from sleep, and they worsen throughout the day.    He has not consulted a gastroenterologist but did follow up with his surgeon who found no issues shortly after his bypass. He admits to not avoiding caffeine or trigger foods, and not elevating the head of his bed at night. He consumes coffee once or twice a week, no other caffeine.  He tries to avoid acidic foods and eats small meals. He is attempting to lose weight.     He takes prednisone during flare-ups, which are infrequent.  He does not take any NSAIDs.    He reports no presence of blood or black coloration in his stool. He does not have a sore  "throat or cough.    Review of Systems   Constitutional:  Negative for chills, fever, malaise/fatigue and weight loss.   Respiratory:  Negative for cough and shortness of breath.    Cardiovascular:  Negative for chest pain and palpitations.   Gastrointestinal:  Positive for heartburn. Negative for abdominal pain, blood in stool, diarrhea, melena, nausea and vomiting.   Skin:  Negative for rash.   Neurological:  Negative for dizziness.     Objective:     Exam:  /70 (BP Location: Right arm, Patient Position: Sitting, BP Cuff Size: Adult)   Pulse 63   Temp 36.3 °C (97.4 °F) (Temporal)   Resp 16   Ht 1.651 m (5' 5\")   Wt 111 kg (244 lb)   SpO2 97%   BMI 40.60 kg/m²  Body mass index is 40.6 kg/m².    Physical Exam  Vitals reviewed.   Constitutional:       General: He is not in acute distress.     Appearance: Normal appearance. He is not ill-appearing.   HENT:      Head: Normocephalic and atraumatic.      Nose: Nose normal.      Mouth/Throat:      Mouth: Mucous membranes are moist.   Eyes:      General: No scleral icterus.  Cardiovascular:      Rate and Rhythm: Normal rate and regular rhythm.      Heart sounds: Normal heart sounds. No murmur heard.  Pulmonary:      Effort: Pulmonary effort is normal. No respiratory distress.      Breath sounds: Normal breath sounds. No wheezing, rhonchi or rales.   Abdominal:      General: Abdomen is flat. Bowel sounds are normal. There is no distension.      Palpations: Abdomen is soft. There is no mass.      Tenderness: There is no abdominal tenderness. There is no guarding or rebound.      Hernia: No hernia is present.   Musculoskeletal:      Cervical back: Normal range of motion and neck supple.      Right lower leg: No edema.      Left lower leg: No edema.   Skin:     General: Skin is warm and dry.      Coloration: Skin is not jaundiced.   Neurological:      General: No focal deficit present.      Mental Status: He is alert and oriented to person, place, and time. "   Psychiatric:         Mood and Affect: Mood normal.         Behavior: Behavior normal.         Thought Content: Thought content normal.       Labs: Reviewed from 6/4/2024    Assessment & Plan:     39 y.o. male with the following -     1. Gastroesophageal reflux disease, unspecified whether esophagitis present  2. History of gastric bypass  Chronic, uncontrolled and progressively worsened over the past year, despite using over-the-counter medications like Tums and Pepcid. He is currently taking pantoprazole 20 mg daily, which provides minimal relief. The dosage of pantoprazole will be increased to 40 mg. If there is no improvement, the dosage can be increased to twice daily. He was advised to elevate the head of his bed, consume smaller meals, and abstain from caffeine/NSAIDs.  Exam and vital signs are reassuring, labs as below.  Urgent referral placed to gastroenterology.  Gave return precautions.  - pantoprazole (PROTONIX) 40 MG Tablet Delayed Response; Take 1 Tablet by mouth every day.  Dispense: 90 Tablet; Refill: 3  - Comp Metabolic Panel; Future  - CBC WITH DIFFERENTIAL; Future  - FERRITIN; Future  - IRON/TOTAL IRON BIND; Future  - Referral to Gastroenterology    Return in 26 days (on 10/16/2024), or if symptoms worsen or fail to improve.    Please note that this dictation was created using voice recognition software. I have made every reasonable attempt to correct obvious errors, but I expect that there are errors of grammar and possibly content that I did not discover before finalizing the note.

## 2024-09-20 NOTE — LETTER
[facility]  [id number]     September 20, 2024    Patient: Cooper Beatty   YOB: 1985   Date of Visit: 9/20/2024       To Whom It May Concern:    Cooper Beatty was seen and treated in our department on 9/20/2024.     Please excuse him from work today.    Sincerely,       Vanesa Castro D.O.

## 2024-09-27 ENCOUNTER — OFFICE VISIT (OUTPATIENT)
Dept: URGENT CARE | Facility: PHYSICIAN GROUP | Age: 39
End: 2024-09-27
Payer: COMMERCIAL

## 2024-09-27 VITALS
SYSTOLIC BLOOD PRESSURE: 132 MMHG | OXYGEN SATURATION: 97 % | BODY MASS INDEX: 40.22 KG/M2 | TEMPERATURE: 98 F | RESPIRATION RATE: 20 BRPM | DIASTOLIC BLOOD PRESSURE: 76 MMHG | HEART RATE: 71 BPM | WEIGHT: 241.4 LBS | HEIGHT: 65 IN

## 2024-09-27 DIAGNOSIS — L02.92 FURUNCLE: ICD-10-CM

## 2024-09-27 PROCEDURE — 3075F SYST BP GE 130 - 139MM HG: CPT | Performed by: REGISTERED NURSE

## 2024-09-27 PROCEDURE — 99214 OFFICE O/P EST MOD 30 MIN: CPT | Performed by: REGISTERED NURSE

## 2024-09-27 PROCEDURE — 3078F DIAST BP <80 MM HG: CPT | Performed by: REGISTERED NURSE

## 2024-09-27 RX ORDER — DOXYCYCLINE HYCLATE 100 MG
100 TABLET ORAL 2 TIMES DAILY
Qty: 14 TABLET | Refills: 0 | Status: SHIPPED | OUTPATIENT
Start: 2024-09-27 | End: 2024-10-04

## 2024-09-27 RX ORDER — MUPIROCIN 20 MG/G
1 OINTMENT TOPICAL 2 TIMES DAILY
Qty: 22 G | Refills: 0 | Status: SHIPPED | OUTPATIENT
Start: 2024-09-27 | End: 2024-10-04

## 2024-09-27 ASSESSMENT — ENCOUNTER SYMPTOMS
CHILLS: 0
FEVER: 0
SHORTNESS OF BREATH: 0
DIZZINESS: 0
ABDOMINAL PAIN: 0

## 2024-09-27 ASSESSMENT — FIBROSIS 4 INDEX: FIB4 SCORE: 0.87

## 2024-09-27 NOTE — PROGRESS NOTES
Subjective:   Cooper Beatty is a 39 y.o. male who presents for Abscess (On (R) side of upper neck, progressively getting bigger, sensitive to touch X 1 week. )    HPI  Painful red bump to the right neck x 7 days. No hx MRSA.  Denies history of kidney dysfunction.  Did attempt to pop the bump on his throat.  No fevers chills body aches.  No neck stiffness or decreased neck range of motion.      Review of Systems   Constitutional:  Negative for chills and fever.   Respiratory:  Negative for shortness of breath.    Cardiovascular:  Negative for chest pain.   Gastrointestinal:  Negative for abdominal pain.   Skin:  Negative for rash.   Neurological:  Negative for dizziness.       Allergies   Allergen Reactions    Nkda [No Known Drug Allergy]        Patient Active Problem List    Diagnosis Date Noted    History of gastric bypass 09/20/2024    Elevated ALT measurement 06/07/2023    Prediabetes 06/07/2023    Rash of foot 05/24/2023    GERD (gastroesophageal reflux disease) 05/24/2023    Obesity 03/15/2022    Daytime somnolence 03/15/2022    Gastroesophageal reflux disease without esophagitis 09/24/2021    Chronic fatigue 09/24/2021    Acute left-sided low back pain without sciatica 05/06/2021    Morbid obesity with BMI of 40.0-44.9, adult (HCC) 07/19/2018    Chronic use of opiate drug for therapeutic purpose 05/01/2015    Elevated liver enzymes 05/01/2015    Positive QuantiFERON-TB Gold test 05/01/2015    Vitamin D deficiency 05/01/2015    Wrist pain 10/30/2013    Hand pain 10/30/2013    Carpal tunnel syndrome 10/30/2013    Hip pain 10/30/2013    Shoulder pain 10/30/2013    Low back pain 10/30/2013    Rheumatoid arthritis (HCC) 11/03/2009       Current Outpatient Medications Ordered in Epic   Medication Sig Dispense Refill    doxycycline (VIBRAMYCIN) 100 MG Tab Take 1 Tablet by mouth 2 times a day for 7 days. 14 Tablet 0    mupirocin (BACTROBAN) 2 % Ointment Apply 1 Application topically 2 times a day for 7 days.  "22 g 0    ENBREL SURECLICK 50 MG/ML Solution Auto-injector Inject 50 mg under the skin every 7 days.      pantoprazole (PROTONIX) 40 MG Tablet Delayed Response Take 1 Tablet by mouth every day. 90 Tablet 3    BELBUCA 750 MCG FILM       BELBUCA 600 MCG FILM       HYDROcodone-acetaminophen (NORCO) 5-325 MG Tab per tablet       gabapentin (NEURONTIN) 300 MG Cap Take 1 to 2 tablets by mouth every 8 hours as needed for nerve pain  Indications: Nerve Pain 180 Cap 5    triamcinolone acetonide (KENALOG) 0.1 % Ointment Apply 1 Application. topically 2 times a day. (Patient not taking: Reported on 9/27/2024) 80 g 1    diazePAM (VALIUM) 5 MG Tab  (Patient not taking: Reported on 9/27/2024)      diclofenac sodium (VOLTAREN) 1 % Gel PLACE ONE GRAM ON THE SKIN THREE TIMES A DAY (Patient not taking: Reported on 9/27/2024) 100 g 0     No current Epic-ordered facility-administered medications on file.       Past Surgical History:   Procedure Laterality Date    LAMINOTOMY  2004    ARTHROPLASTY      hip replacements was 19 and 18 yr age    OTHER NEUROLOGICAL SURG      back x 4    OTHER ORTHOPEDIC SURGERY      2 complete hip replacements, ankle       Social History     Tobacco Use    Smoking status: Never    Smokeless tobacco: Never   Vaping Use    Vaping status: Never Used   Substance Use Topics    Alcohol use: No    Drug use: No       family history includes Heart Disease (age of onset: 60) in his maternal grandfather; Hyperlipidemia in his mother; Hypertension in his mother.     Problem list, medications, and allergies reviewed by myself today in Epic.     Objective:   /76 (BP Location: Left arm, Patient Position: Sitting, BP Cuff Size: Adult long)   Pulse 71   Temp 36.7 °C (98 °F) (Temporal)   Resp 20   Ht 1.651 m (5' 5\")   Wt 110 kg (241 lb 6.5 oz)   SpO2 97%   BMI 40.17 kg/m²     Physical Exam  Vitals and nursing note reviewed.   Constitutional:       Appearance: He is not ill-appearing or toxic-appearing.   HENT:    "   Head: Normocephalic.   Eyes:      Pupils: Pupils are equal, round, and reactive to light.   Neck:        Comments: TTP, firm furuncle.  No fluctuance.  No active drainage.  No streaking up neck.  Cardiovascular:      Rate and Rhythm: Normal rate.   Pulmonary:      Effort: Pulmonary effort is normal.   Neurological:      General: No focal deficit present.      Mental Status: He is alert.   Psychiatric:         Mood and Affect: Mood normal.         Assessment/Plan:     I personally reviewed prior external notes and test results pertinent to today's visit as well as additional imaging and testing completed in clinic today.    I introduced myself as Rogelio Oropeza a Nurse Practitioner.    1. Furuncle  doxycycline (VIBRAMYCIN) 100 MG Tab    mupirocin (BACTROBAN) 2 % Ointment      Very pleasant 39-year-old male presenting with tender furuncle to the right lateral neck.  Started smaller but attempted to pop it which causes it to grow.  On exam there was no fluctuance requiring drainage.  Was tender.  No fevers chills body aches.  No history of MRSA.  No signs of airway compromise.  Normal neck range of motion.  Will place on oral doxycycline as well as topical mupirocin.  Wound care discussed.  Warm compresses.  Work note given.    Medication discussed included indication for use and the potential benefits and side effects. The Patient was encouraged to monitor symptoms closely, and we reviewed the signs and symptoms that require a higher level of care through the emergency department. Patient verbalized understanding.    Please note that this dictation was created using voice recognition software. I have made every reasonable attempt to correct obvious errors, but I expect that there are errors of grammar and possibly content that I did not discover before finalizing the note.    This note was electronically signed by AIDA Marcial

## 2024-09-27 NOTE — LETTER
September 27, 2024         Patient: Cooper Beatty   YOB: 1985   Date of Visit: 9/27/2024           To Whom it May Concern:    Cooper Beatty was seen in my clinic on 9/27/2024. He may return to work on 09/28/24.    If you have any questions or concerns, please don't hesitate to call.        Sincerely,           AIDA Marcial  Electronically Signed

## 2024-10-07 ENCOUNTER — OFFICE VISIT (OUTPATIENT)
Dept: URGENT CARE | Facility: PHYSICIAN GROUP | Age: 39
End: 2024-10-07
Payer: COMMERCIAL

## 2024-10-07 VITALS
WEIGHT: 242.51 LBS | SYSTOLIC BLOOD PRESSURE: 122 MMHG | DIASTOLIC BLOOD PRESSURE: 72 MMHG | OXYGEN SATURATION: 98 % | HEART RATE: 59 BPM | BODY MASS INDEX: 40.4 KG/M2 | RESPIRATION RATE: 16 BRPM | HEIGHT: 65 IN | TEMPERATURE: 97.8 F

## 2024-10-07 DIAGNOSIS — L02.92 FURUNCLE: ICD-10-CM

## 2024-10-07 PROCEDURE — 3074F SYST BP LT 130 MM HG: CPT | Performed by: NURSE PRACTITIONER

## 2024-10-07 PROCEDURE — 99213 OFFICE O/P EST LOW 20 MIN: CPT | Performed by: NURSE PRACTITIONER

## 2024-10-07 PROCEDURE — 3078F DIAST BP <80 MM HG: CPT | Performed by: NURSE PRACTITIONER

## 2024-10-07 RX ORDER — SULFAMETHOXAZOLE AND TRIMETHOPRIM 800; 160 MG/1; MG/1
1 TABLET ORAL 2 TIMES DAILY
Qty: 14 TABLET | Refills: 0 | Status: SHIPPED | OUTPATIENT
Start: 2024-10-07 | End: 2024-10-14

## 2024-10-07 ASSESSMENT — FIBROSIS 4 INDEX: FIB4 SCORE: 0.87

## 2024-10-16 ENCOUNTER — APPOINTMENT (OUTPATIENT)
Dept: MEDICAL GROUP | Facility: MEDICAL CENTER | Age: 39
End: 2024-10-16
Payer: COMMERCIAL

## 2024-10-26 ENCOUNTER — APPOINTMENT (OUTPATIENT)
Dept: RADIOLOGY | Facility: MEDICAL CENTER | Age: 39
End: 2024-10-26
Attending: COLON & RECTAL SURGERY
Payer: COMMERCIAL

## 2024-11-01 ENCOUNTER — APPOINTMENT (OUTPATIENT)
Dept: RADIOLOGY | Facility: MEDICAL CENTER | Age: 39
End: 2024-11-01
Attending: COLON & RECTAL SURGERY
Payer: COMMERCIAL

## 2025-01-23 ENCOUNTER — OFFICE VISIT (OUTPATIENT)
Dept: MEDICAL GROUP | Facility: MEDICAL CENTER | Age: 40
End: 2025-01-23
Payer: COMMERCIAL

## 2025-01-23 ENCOUNTER — HOSPITAL ENCOUNTER (OUTPATIENT)
Dept: RADIOLOGY | Facility: MEDICAL CENTER | Age: 40
End: 2025-01-23
Attending: NURSE PRACTITIONER
Payer: COMMERCIAL

## 2025-01-23 ENCOUNTER — HOSPITAL ENCOUNTER (OUTPATIENT)
Dept: RADIOLOGY | Facility: MEDICAL CENTER | Age: 40
End: 2025-01-23
Attending: COLON & RECTAL SURGERY
Payer: COMMERCIAL

## 2025-01-23 ENCOUNTER — APPOINTMENT (OUTPATIENT)
Dept: URGENT CARE | Facility: CLINIC | Age: 40
End: 2025-01-23
Payer: COMMERCIAL

## 2025-01-23 VITALS
TEMPERATURE: 98.4 F | SYSTOLIC BLOOD PRESSURE: 140 MMHG | HEIGHT: 65 IN | DIASTOLIC BLOOD PRESSURE: 80 MMHG | OXYGEN SATURATION: 98 % | HEART RATE: 78 BPM | WEIGHT: 245.37 LBS | BODY MASS INDEX: 40.88 KG/M2
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
## 2025-01-23 DIAGNOSIS — M54.6 ACUTE MIDLINE THORACIC BACK PAIN: ICD-10-CM

## 2025-01-23 DIAGNOSIS — Z01.818 PREOP EXAMINATION: ICD-10-CM

## 2025-01-23 DIAGNOSIS — M54.50 LUMBAR PAIN: ICD-10-CM

## 2025-01-23 DIAGNOSIS — E66.01 MORBID OBESITY (HCC): ICD-10-CM

## 2025-01-23 DIAGNOSIS — E66.01 MORBID OBESITY WITH BMI OF 40.0-44.9, ADULT (HCC): ICD-10-CM

## 2025-01-23 DIAGNOSIS — R63.5 WEIGHT GAIN: ICD-10-CM

## 2025-01-23 DIAGNOSIS — M25.531 WRIST PAIN, ACUTE, RIGHT: ICD-10-CM

## 2025-01-23 DIAGNOSIS — Z23 NEED FOR VACCINATION: ICD-10-CM

## 2025-01-23 DIAGNOSIS — K21.9 GASTROESOPHAGEAL REFLUX DISEASE WITHOUT ESOPHAGITIS: ICD-10-CM

## 2025-01-23 DIAGNOSIS — M06.9 RHEUMATOID ARTHRITIS INVOLVING MULTIPLE SITES, UNSPECIFIED WHETHER RHEUMATOID FACTOR PRESENT (HCC): ICD-10-CM

## 2025-01-23 DIAGNOSIS — Z00.00 PE (PHYSICAL EXAM), ANNUAL: ICD-10-CM

## 2025-01-23 PROCEDURE — 72080 X-RAY EXAM THORACOLMB 2/> VW: CPT

## 2025-01-23 PROCEDURE — 99395 PREV VISIT EST AGE 18-39: CPT | Mod: 25 | Performed by: NURSE PRACTITIONER

## 2025-01-23 PROCEDURE — 99214 OFFICE O/P EST MOD 30 MIN: CPT | Mod: 25 | Performed by: NURSE PRACTITIONER

## 2025-01-23 PROCEDURE — 90471 IMMUNIZATION ADMIN: CPT

## 2025-01-23 PROCEDURE — 71046 X-RAY EXAM CHEST 2 VIEWS: CPT

## 2025-01-23 PROCEDURE — 90656 IIV3 VACC NO PRSV 0.5 ML IM: CPT

## 2025-01-23 ASSESSMENT — ENCOUNTER SYMPTOMS
WEIGHT LOSS: 0
DIZZINESS: 0
EYE REDNESS: 0
DIARRHEA: 0
SENSORY CHANGE: 0
FEVER: 0
SPEECH CHANGE: 0
BLOOD IN STOOL: 0
BACK PAIN: 1
CHILLS: 0
BRUISES/BLEEDS EASILY: 0
COUGH: 0
MYALGIAS: 0
INSOMNIA: 0
LOSS OF CONSCIOUSNESS: 0
FOCAL WEAKNESS: 0
SHORTNESS OF BREATH: 0
SPUTUM PRODUCTION: 0
SORE THROAT: 0
ABDOMINAL PAIN: 0
EYE PAIN: 0
POLYDIPSIA: 0
CONSTIPATION: 0
HEADACHES: 0
FLANK PAIN: 0
DEPRESSION: 0
SINUS PAIN: 0
WHEEZING: 0
NAUSEA: 0
TREMORS: 0
VOMITING: 0
PALPITATIONS: 0
WEAKNESS: 0
NERVOUS/ANXIOUS: 0
EYE DISCHARGE: 0

## 2025-01-23 ASSESSMENT — FIBROSIS 4 INDEX: FIB4 SCORE: 0.87

## 2025-01-23 ASSESSMENT — PATIENT HEALTH QUESTIONNAIRE - PHQ9: CLINICAL INTERPRETATION OF PHQ2 SCORE: 0

## 2025-01-23 NOTE — LETTER
January 23, 2025       Patient: Cooper Beatty   YOB: 1985   Date of Visit: 1/23/2025         To Whom It May Concern:    Cooper Beatty was seen and examined today.  Please excuse from work for today.  He can return to work when he feels better.     If you have any questions or concerns, please don't hesitate to call [phone]          Sincerely,          MP Tay.  Electronically Signed
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
## 2025-01-27 ENCOUNTER — HOSPITAL ENCOUNTER (OUTPATIENT)
Dept: LAB | Facility: MEDICAL CENTER | Age: 40
End: 2025-01-27
Attending: NURSE PRACTITIONER
Payer: COMMERCIAL

## 2025-01-27 DIAGNOSIS — Z00.00 PE (PHYSICAL EXAM), ANNUAL: ICD-10-CM

## 2025-01-27 LAB
25(OH)D3 SERPL-MCNC: 14 NG/ML (ref 30–100)
ALBUMIN SERPL BCP-MCNC: 4.1 G/DL (ref 3.2–4.9)
ALBUMIN/GLOB SERPL: 1.4 G/DL
ALP SERPL-CCNC: 85 U/L (ref 30–99)
ALT SERPL-CCNC: 36 U/L (ref 2–50)
ANION GAP SERPL CALC-SCNC: 10 MMOL/L (ref 7–16)
AST SERPL-CCNC: 16 U/L (ref 12–45)
BILIRUB SERPL-MCNC: 0.5 MG/DL (ref 0.1–1.5)
BUN SERPL-MCNC: 17 MG/DL (ref 8–22)
CALCIUM ALBUM COR SERPL-MCNC: 8.8 MG/DL (ref 8.5–10.5)
CALCIUM SERPL-MCNC: 8.9 MG/DL (ref 8.5–10.5)
CHLORIDE SERPL-SCNC: 103 MMOL/L (ref 96–112)
CHOLEST SERPL-MCNC: 154 MG/DL (ref 100–199)
CO2 SERPL-SCNC: 27 MMOL/L (ref 20–33)
CREAT SERPL-MCNC: 0.83 MG/DL (ref 0.5–1.4)
ERYTHROCYTE [DISTWIDTH] IN BLOOD BY AUTOMATED COUNT: 43.5 FL (ref 35.9–50)
EST. AVERAGE GLUCOSE BLD GHB EST-MCNC: 126 MG/DL
FASTING STATUS PATIENT QL REPORTED: NORMAL
GFR SERPLBLD CREATININE-BSD FMLA CKD-EPI: 115 ML/MIN/1.73 M 2
GLOBULIN SER CALC-MCNC: 2.9 G/DL (ref 1.9–3.5)
GLUCOSE SERPL-MCNC: 89 MG/DL (ref 65–99)
HBA1C MFR BLD: 6 % (ref 4–5.6)
HCT VFR BLD AUTO: 48.7 % (ref 42–52)
HDLC SERPL-MCNC: 67 MG/DL
HGB BLD-MCNC: 15.7 G/DL (ref 14–18)
LDLC SERPL CALC-MCNC: 74 MG/DL
MCH RBC QN AUTO: 26.7 PG (ref 27–33)
MCHC RBC AUTO-ENTMCNC: 32.2 G/DL (ref 32.3–36.5)
MCV RBC AUTO: 82.7 FL (ref 81.4–97.8)
PLATELET # BLD AUTO: 254 K/UL (ref 164–446)
PMV BLD AUTO: 9.5 FL (ref 9–12.9)
POTASSIUM SERPL-SCNC: 4 MMOL/L (ref 3.6–5.5)
PROT SERPL-MCNC: 7 G/DL (ref 6–8.2)
RBC # BLD AUTO: 5.89 M/UL (ref 4.7–6.1)
SODIUM SERPL-SCNC: 140 MMOL/L (ref 135–145)
T3FREE SERPL-MCNC: 3.85 PG/ML (ref 2–4.4)
T4 FREE SERPL-MCNC: 1.37 NG/DL (ref 0.93–1.7)
TRIGL SERPL-MCNC: 65 MG/DL (ref 0–149)
TSH SERPL-ACNC: 1.9 UIU/ML (ref 0.35–5.5)
WBC # BLD AUTO: 10.4 K/UL (ref 4.8–10.8)

## 2025-01-27 PROCEDURE — 84481 FREE ASSAY (FT-3): CPT

## 2025-01-27 PROCEDURE — 85027 COMPLETE CBC AUTOMATED: CPT

## 2025-01-27 PROCEDURE — 84439 ASSAY OF FREE THYROXINE: CPT

## 2025-01-27 PROCEDURE — 80053 COMPREHEN METABOLIC PANEL: CPT

## 2025-01-27 PROCEDURE — 36415 COLL VENOUS BLD VENIPUNCTURE: CPT

## 2025-01-27 PROCEDURE — 82306 VITAMIN D 25 HYDROXY: CPT

## 2025-01-27 PROCEDURE — 80061 LIPID PANEL: CPT

## 2025-01-27 PROCEDURE — 84443 ASSAY THYROID STIM HORMONE: CPT

## 2025-01-27 PROCEDURE — 83036 HEMOGLOBIN GLYCOSYLATED A1C: CPT

## 2025-01-28 DIAGNOSIS — M54.6 THORACIC BACK PAIN, UNSPECIFIED BACK PAIN LATERALITY, UNSPECIFIED CHRONICITY: ICD-10-CM

## 2025-01-28 DIAGNOSIS — M54.50 LUMBAR PAIN: ICD-10-CM

## 2025-02-04 ENCOUNTER — APPOINTMENT (OUTPATIENT)
Dept: MEDICAL GROUP | Facility: MEDICAL CENTER | Age: 40
End: 2025-02-04
Payer: COMMERCIAL

## 2025-02-11 ENCOUNTER — APPOINTMENT (OUTPATIENT)
Dept: ADMISSIONS | Facility: MEDICAL CENTER | Age: 40
End: 2025-02-11
Attending: COLON & RECTAL SURGERY
Payer: COMMERCIAL

## 2025-02-11 ENCOUNTER — HOSPITAL ENCOUNTER (OUTPATIENT)
Facility: MEDICAL CENTER | Age: 40
End: 2025-02-11
Attending: COLON & RECTAL SURGERY | Admitting: COLON & RECTAL SURGERY
Payer: COMMERCIAL

## 2025-02-13 ENCOUNTER — PRE-ADMISSION TESTING (OUTPATIENT)
Dept: ADMISSIONS | Facility: MEDICAL CENTER | Age: 40
End: 2025-02-13
Attending: COLON & RECTAL SURGERY
Payer: COMMERCIAL

## 2025-02-13 NOTE — PREADMIT AVS NOTE
Current Medications   Medication Instructions    ENBREL SURECLICK 50 MG/ML Solution Auto-injector Follow instructions from surgeon or specialist.    pantoprazole (PROTONIX) 40 MG Tablet Delayed Response Continue taking medication as prescribed, including morning of procedure     BELBUCA 750 MCG FILM Follow instructions from surgeon or specialist.    HYDROcodone-acetaminophen (NORCO) 5-325 MG Tab per tablet As needed medication, may take if needed, including morning of procedure     gabapentin (NEURONTIN) 300 MG Cap As needed medication, may take if needed, including morning of procedure

## 2025-02-13 NOTE — OR NURSING
RN tele pre admit appointment completed with patient:  Medication instructions given according to the Guideline for Pre-Operative Medication Management.  Patient aware medication instructions can be reviewed in the pre admit AVS note.    Preparing For Your Procedure packet reviewed with patient including fasting and bathing guidelines.  Patient instructed to stop taking all vitamins and herbal supplements 7 days prior to surgery and instructed to stop any NSAIDS 5 days prior to surgery unless otherwise instructed by medical provider. Procedure date 2/19/2025.    During pre admit appointment this RN encouraged patient to increase fluid intake the day prior to procedure including intake of electrolyte drinks such as Gatorade or electrolyte water and patient may have clear liquids until 2 hours prior to procedure.     Patient verbalizes understanding of all instructions given. No further questions at this time.

## 2025-02-14 ENCOUNTER — TELEMEDICINE (OUTPATIENT)
Dept: MEDICAL GROUP | Facility: MEDICAL CENTER | Age: 40
End: 2025-02-14
Payer: COMMERCIAL

## 2025-02-14 VITALS — BODY MASS INDEX: 40.82 KG/M2 | HEIGHT: 65 IN | WEIGHT: 245 LBS

## 2025-02-14 DIAGNOSIS — R73.03 PREDIABETES: ICD-10-CM

## 2025-02-14 DIAGNOSIS — E55.9 VITAMIN D DEFICIENCY: ICD-10-CM

## 2025-02-14 DIAGNOSIS — R53.83 OTHER FATIGUE: ICD-10-CM

## 2025-02-14 DIAGNOSIS — K21.9 GASTROESOPHAGEAL REFLUX DISEASE, UNSPECIFIED WHETHER ESOPHAGITIS PRESENT: ICD-10-CM

## 2025-02-14 DIAGNOSIS — E66.01 MORBID OBESITY WITH BMI OF 40.0-44.9, ADULT (HCC): ICD-10-CM

## 2025-02-14 PROBLEM — E66.9 OBESITY: Status: RESOLVED | Noted: 2022-03-15 | Resolved: 2025-02-14

## 2025-02-14 PROBLEM — R63.5 WEIGHT GAIN: Status: RESOLVED | Noted: 2025-01-23 | Resolved: 2025-02-14

## 2025-02-14 PROCEDURE — 99214 OFFICE O/P EST MOD 30 MIN: CPT | Performed by: NURSE PRACTITIONER

## 2025-02-14 RX ORDER — METFORMIN HYDROCHLORIDE 500 MG/1
500 TABLET, EXTENDED RELEASE ORAL DAILY
Qty: 90 TABLET | Refills: 1 | Status: SHIPPED | OUTPATIENT
Start: 2025-02-14 | End: 2026-03-21

## 2025-02-14 ASSESSMENT — ENCOUNTER SYMPTOMS
FEVER: 0
CHILLS: 0
PALPITATIONS: 0

## 2025-02-14 ASSESSMENT — FIBROSIS 4 INDEX: FIB4 SCORE: 0.39

## 2025-02-14 NOTE — PROGRESS NOTES
Virtual Visit: Established Patient   This visit was conducted via Teams using secure and encrypted videoconferencing technology.   The patient was in their home in the [facility].    The patient's identity was confirmed and verbal consent was obtained for this virtual visit.   This evaluation was conducted via Teams using secure and encrypted videoconferencing technology. The patient was in their home in the [facility].    The patient's identity was confirmed and verbal consent was obtained for this virtual visit.     Patient agreed to using BASIA: yes    Cooper was seen today for lab results and weight check.    Diagnoses and all orders for this visit:    Morbid obesity with BMI of 40.0-44.9, adult ([facility])  -     Referral to Nutrition Services  -     metFORMIN ER (GLUCOPHAGE XR) 500 MG TABLET SR 24 HR; Take 1 Tablet by mouth every day.    Prediabetes  -     Referral to Nutrition Services  -     metFORMIN ER (GLUCOPHAGE XR) 500 MG TABLET SR 24 HR; Take 1 Tablet by mouth every day.    Gastroesophageal reflux disease, unspecified whether esophagitis present    Vitamin D deficiency              Assessment & Plan  1. Prediabetes.  Chronic and stable condition.  His A1c level is currently at 6, indicating prediabetes. The goal is to reduce this to below 5.6.  Trial of metformin    2. Gastroesophageal Reflux Disease (GERD).  Chronic and stable condition.  He reports that Protonix has been effective in managing his acid reflux symptoms. He will continue taking Protonix as prescribed.  Continue    3. Vitamin D Deficiency.  Chronic and stable condition.  His vitamin D levels are very low at 14. He is advised to continue his vitamin D supplementation due to his history of gastric bypass surgery and Protonix use. Over-the-counter daily vitamin D supplements are recommended, with annual monitoring of his levels.  Take 5000 IUs daily take with food for better absorption.    4. Fatigue.  Chronic and stable condition.  His  free testosterone level was low at 1.5 in 2023, which could be contributing to his fatigue. However, other factors such as stress, lack of sleep, and potential sleep apnea may also be contributing. A referral for a sleep apnea evaluation has been made to address his fatigue.    5.  Obesity  Chronic and stable condition.  Patient advised to continue working out, talked about the importance of healthy nutrition, stress control and adequate sleep.  Patient will be evaluated for sleep apnea otherwise continue eating healthy, referral to nutritionist has been placed.    Follow-up  The patient is scheduled for a follow-up visit in June 2025.    PROCEDURE  The patient underwent gastric bypass surgery in the past.    Subjective:   CC:   Chief Complaint   Patient presents with    Lab Results    Weight Check       History of Present Illness  The patient is a 39-year-old male who presents for a follow-up on lab work, prediabetes, weight management, acid reflux, fatigue, and vitamin D deficiency.    He reports a persistent craving for sweets, which he has been attempting to curb. Despite his efforts to maintain an active lifestyle, including regular gym attendance, he has observed an increase in his weight.    He has been managing his acid reflux with Protonix, which he finds effective. He notes that the symptoms return if he misses a dose. Prior to starting Protonix, he was consuming Tums at a rate of approximately one bottle every one to two days. He underwent gastric bypass surgery in the past and did not experience these issues post-surgery.    He expresses concern about his testosterone levels, suspecting they may be contributing to his fatigue. He has not been evaluated for sleep apnea and reports an average sleep duration of five hours per night due to his work schedule.    MEDICATIONS  Protonix, Tums (discontinued)       Review of Systems   Constitutional:  Negative for chills, fever and malaise/fatigue.  "  Cardiovascular:  Negative for chest pain, palpitations and leg swelling.        Current medicines (including changes today)  Current Outpatient Medications   Medication Sig Dispense Refill    metFORMIN ER (GLUCOPHAGE XR) 500 MG TABLET SR 24 HR Take 1 Tablet by mouth every day. 90 Tablet 1    ENBREL SURECLICK 50 MG/ML Solution Auto-injector Inject 50 mg under the skin every 7 days. Tuesday's      pantoprazole (PROTONIX) 40 MG Tablet Delayed Response Take 1 Tablet by mouth every day. 90 Tablet 3    BELBUCA 750 MCG FILM 1 time a day as needed.      HYDROcodone-acetaminophen (NORCO) 5-325 MG Tab per tablet Take  by mouth 1 time a day as needed.      gabapentin (NEURONTIN) 300 MG Cap Take 1 to 2 tablets by mouth every 8 hours as needed for nerve pain  Indications: Nerve Pain 180 Cap 5     No current facility-administered medications for this visit.        Objective:   Ht 1.651 m (5' 5\") Comment: pt stated  Wt 111 kg (245 lb) Comment: pt stated  BMI 40.77 kg/m²     Physical Exam:  Constitutional: Alert, no distress, well-groomed.  Skin: No rashes in visible areas.  Eye: Round. Conjunctiva clear, lids normal. No icterus.   ENMT: Lips pink without lesions, good dentition, moist mucous membranes. Phonation normal.  Neck: No masses, no thyromegaly. Moves freely without pain.  Respiratory: Unlabored respiratory effort, no cough or audible wheeze  Psych: Alert and oriented x3, normal affect and mood.     Results  Laboratory Studies  A1c is 6. Thyroid levels are normal. Vitamin D level is 14. Testosterone level was 623 in 2023. Free testosterone level was 1.5 in 2023.       Discussed with patient possible alternative diagnoses, patient is to take all medications as prescribed.      If symptoms persist FU w/PCP, if symptoms worsen go to emergency room.      If experiencing any side effects from prescribed medications report to the office immediately or go to emergency room.     Reviewed indication, dosage, usage and potential " adverse effects of prescribed medications.      Reviewed risks and benefits of treatment plan. Patient verbalizes understanding of all instruction and verbally agrees to plan.     Discussed plan with the patient, and patient agrees to the above.      I personally reviewed prior external notes and test results pertinent to today's visit.     No follow-ups on file. June

## 2025-02-19 RX ORDER — SODIUM CHLORIDE, SODIUM LACTATE, POTASSIUM CHLORIDE, CALCIUM CHLORIDE 600; 310; 30; 20 MG/100ML; MG/100ML; MG/100ML; MG/100ML
INJECTION, SOLUTION INTRAVENOUS CONTINUOUS
Status: CANCELLED | OUTPATIENT
Start: 2025-02-19 | End: 2025-02-19

## 2025-03-11 NOTE — Clinical Note
REFERRAL APPROVAL NOTICE         Sent on February 18, 2025                   Cooper Beatty  [address] [facility] [address]                   Dear Mr. Beatty,    After a careful review of the medical information and benefit coverage, [facility] has processed your referral. See below for additional details.    If applicable, you must be actively enrolled with your insurance for coverage of the authorized service. If you have any questions regarding your coverage, please contact your insurance directly.    REFERRAL INFORMATION   Referral #:  [id number]  Referred-To Department    Referred-By Provider:  AIDA Castro   [address] [facility] [address]      [address]  [address]  [facility] [id number] [phone] [address] [facility] [id number] [phone]    Referral Start Date:  02/14/2025  Referral End Date:   02/14/2026             SCHEDULING  If you do not already have an appointment, please call [phone] to make an appointment.     MORE INFORMATION  If you do not already have a [employer] account, sign up at: [email or web address]  You can access your medical information, make appointments, see lab results, billing information, and more.  If you have questions regarding this referral, please contact  the [facility] Referrals department at:             [phone]. Monday - Friday 8:00AM - 5:00PM.     Sincerely,    [facility]     no

## 2025-03-30 ENCOUNTER — APPOINTMENT (OUTPATIENT)
Dept: URGENT CARE | Facility: PHYSICIAN GROUP | Age: 40
End: 2025-03-30
Payer: COMMERCIAL

## 2025-03-30 ENCOUNTER — OFFICE VISIT (OUTPATIENT)
Dept: URGENT CARE | Facility: PHYSICIAN GROUP | Age: 40
End: 2025-03-30
Payer: COMMERCIAL

## 2025-03-30 ENCOUNTER — HOSPITAL ENCOUNTER (OUTPATIENT)
Dept: RADIOLOGY | Facility: MEDICAL CENTER | Age: 40
End: 2025-03-30
Attending: FAMILY MEDICINE
Payer: COMMERCIAL

## 2025-03-30 VITALS
RESPIRATION RATE: 18 BRPM | HEIGHT: 65 IN | WEIGHT: 254 LBS | DIASTOLIC BLOOD PRESSURE: 82 MMHG | OXYGEN SATURATION: 98 % | BODY MASS INDEX: 42.32 KG/M2 | HEART RATE: 68 BPM | SYSTOLIC BLOOD PRESSURE: 146 MMHG | TEMPERATURE: 98 F

## 2025-03-30 DIAGNOSIS — M79.605 ACUTE PAIN OF LEFT LOWER EXTREMITY: ICD-10-CM

## 2025-03-30 DIAGNOSIS — R93.89 ABNORMAL X-RAY: ICD-10-CM

## 2025-03-30 DIAGNOSIS — M25.552 LEFT HIP PAIN: ICD-10-CM

## 2025-03-30 PROCEDURE — 73502 X-RAY EXAM HIP UNI 2-3 VIEWS: CPT | Mod: LT

## 2025-03-30 RX ORDER — PREDNISONE 20 MG/1
40 TABLET ORAL DAILY
Qty: 10 TABLET | Refills: 0 | Status: SHIPPED | OUTPATIENT
Start: 2025-03-30 | End: 2025-04-04

## 2025-03-30 RX ORDER — CYCLOBENZAPRINE HCL 10 MG
10 TABLET ORAL 3 TIMES DAILY PRN
Qty: 20 TABLET | Refills: 0 | Status: SHIPPED | OUTPATIENT
Start: 2025-03-30

## 2025-03-30 RX ORDER — KETOROLAC TROMETHAMINE 15 MG/ML
15 INJECTION, SOLUTION INTRAMUSCULAR; INTRAVENOUS ONCE
Status: COMPLETED | OUTPATIENT
Start: 2025-03-30 | End: 2025-03-30

## 2025-03-30 RX ADMIN — KETOROLAC TROMETHAMINE 15 MG: 15 INJECTION, SOLUTION INTRAMUSCULAR; INTRAVENOUS at 10:39

## 2025-03-30 ASSESSMENT — ENCOUNTER SYMPTOMS
WEIGHT LOSS: 0
MYALGIAS: 0
EYE REDNESS: 0
NAUSEA: 0
EYE DISCHARGE: 0
VOMITING: 0

## 2025-03-30 ASSESSMENT — FIBROSIS 4 INDEX: FIB4 SCORE: 0.39
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
## 2025-04-02 NOTE — Clinical Note
REFERRAL APPROVAL NOTICE         Sent on April 2, 2025                   Cooper Beatty  [address] [facility] [address]                   Dear Mr. Beatty,    After a careful review of the medical information and benefit coverage, [facility] has processed your referral. See below for additional details.    If applicable, you must be actively enrolled with your insurance for coverage of the authorized service. If you have any questions regarding your coverage, please contact your insurance directly.    REFERRAL INFORMATION   Referral #:  [id number]  Referred-To Department    Referred-By Provider:  Orthopedics    Raymundo Mackey M.D.   [facility] (joint)      [address]  [address]  [facility] [address]  [phone] [address] [place] [id number]  [phone]    Referral Start Date:  03/30/2025  Referral End Date:   03/30/2026             SCHEDULING  If you do not already have an appointment, please call [phone] to make an appointment.     MORE INFORMATION  If you do not already have a [facility] account, sign up at: [email or web address]  You can access your medical information, make appointments, see lab results, billing information, and more.  If you have questions regarding this referral, please contact  the [facility] Referrals department at:             [phone]. Monday - Friday 8:00AM - 5:00PM.     Sincerely,    [facility]

## 2025-04-16 ENCOUNTER — HOSPITAL ENCOUNTER (OUTPATIENT)
Dept: RADIOLOGY | Facility: MEDICAL CENTER | Age: 40
End: 2025-04-16
Attending: ORTHOPAEDIC SURGERY
Payer: COMMERCIAL

## 2025-05-09 ENCOUNTER — APPOINTMENT (OUTPATIENT)
Dept: PHYSICAL THERAPY | Facility: REHABILITATION | Age: 40
End: 2025-05-09
Attending: NURSE PRACTITIONER
Payer: COMMERCIAL

## 2025-05-14 ENCOUNTER — APPOINTMENT (OUTPATIENT)
Dept: PHYSICAL THERAPY | Facility: REHABILITATION | Age: 40
End: 2025-05-14
Attending: NURSE PRACTITIONER
Payer: COMMERCIAL

## 2025-05-16 ENCOUNTER — APPOINTMENT (OUTPATIENT)
Dept: PHYSICAL THERAPY | Facility: REHABILITATION | Age: 40
End: 2025-05-16
Attending: NURSE PRACTITIONER
Payer: COMMERCIAL

## 2025-05-21 ENCOUNTER — APPOINTMENT (OUTPATIENT)
Dept: PHYSICAL THERAPY | Facility: REHABILITATION | Age: 40
End: 2025-05-21
Attending: NURSE PRACTITIONER
Payer: COMMERCIAL

## 2025-05-23 ENCOUNTER — APPOINTMENT (OUTPATIENT)
Dept: PHYSICAL THERAPY | Facility: REHABILITATION | Age: 40
End: 2025-05-23
Attending: NURSE PRACTITIONER
Payer: COMMERCIAL

## 2025-05-28 ENCOUNTER — OFFICE VISIT (OUTPATIENT)
Dept: MEDICAL GROUP | Facility: LAB | Age: 40
End: 2025-05-28
Payer: COMMERCIAL

## 2025-05-28 ENCOUNTER — TELEPHONE (OUTPATIENT)
Dept: MEDICAL GROUP | Facility: LAB | Age: 40
End: 2025-05-28

## 2025-05-28 ENCOUNTER — APPOINTMENT (OUTPATIENT)
Dept: PHYSICAL THERAPY | Facility: REHABILITATION | Age: 40
End: 2025-05-28
Attending: NURSE PRACTITIONER
Payer: COMMERCIAL

## 2025-05-28 VITALS
WEIGHT: 251.8 LBS | OXYGEN SATURATION: 94 % | RESPIRATION RATE: 16 BRPM | HEIGHT: 65 IN | TEMPERATURE: 97.4 F | HEART RATE: 72 BPM | DIASTOLIC BLOOD PRESSURE: 76 MMHG | BODY MASS INDEX: 41.95 KG/M2 | SYSTOLIC BLOOD PRESSURE: 132 MMHG

## 2025-05-28 DIAGNOSIS — R73.03 PREDIABETES: ICD-10-CM

## 2025-05-28 DIAGNOSIS — E66.01 MORBID OBESITY WITH BMI OF 40.0-44.9, ADULT (HCC): Primary | ICD-10-CM

## 2025-05-28 PROBLEM — R74.01 ELEVATED ALT MEASUREMENT: Status: RESOLVED | Noted: 2023-06-07 | Resolved: 2025-05-28

## 2025-05-28 PROCEDURE — 99214 OFFICE O/P EST MOD 30 MIN: CPT | Performed by: STUDENT IN AN ORGANIZED HEALTH CARE EDUCATION/TRAINING PROGRAM

## 2025-05-28 PROCEDURE — 3078F DIAST BP <80 MM HG: CPT | Performed by: STUDENT IN AN ORGANIZED HEALTH CARE EDUCATION/TRAINING PROGRAM

## 2025-05-28 PROCEDURE — 3075F SYST BP GE 130 - 139MM HG: CPT | Performed by: STUDENT IN AN ORGANIZED HEALTH CARE EDUCATION/TRAINING PROGRAM

## 2025-05-28 ASSESSMENT — ENCOUNTER SYMPTOMS
SHORTNESS OF BREATH: 0
DIARRHEA: 0
VOMITING: 0
NAUSEA: 0
WEIGHT LOSS: 0
CONSTIPATION: 0
ABDOMINAL PAIN: 0
HEARTBURN: 0
FEVER: 0
COUGH: 0
CHILLS: 0

## 2025-05-28 ASSESSMENT — FIBROSIS 4 INDEX: FIB4 SCORE: 0.4

## 2025-05-28 NOTE — PATIENT INSTRUCTIONS
Zepound (Tirzepatide)  Initial: 2.5 mg once weekly for 4 weeks, then increase to 5 mg once weekly. May further increase dose in 2.5 mg/week increments every 4 weeks, if needed (maximum weekly dose: 15 mg/week).  Note: The lower initial dose (2.5 mg weekly) is intended to reduce GI symptoms; it does not provide effective weight loss. If changing the day of administration is necessary, allow at least 72 hours between 2 doses.     If not covered, then we can try Wegovy (??m?gl?tide) instead  Week 1 through week 4 : 0.25 mg once weekly.  Week 5 through week 8: 0.5 mg once weekly.  Week 9 through week 12: 1 mg once weekly.  Week 13 through week 16: 1.7 mg once weekly.  Week 17 and thereafter (maintenance dosage): 2.4 mg once weekly (preferred regimen)  If not tolerated, alternative maintenance dosage of 1.7 mg once weekly.

## 2025-05-28 NOTE — PROGRESS NOTES
Verbal consent was acquired by the patient to use [employer] ambient listening note generation during this visit Yes.    Subjective:     Chief Complaint   Patient presents with    Follow-Up     Discuss Weight     HPI:   PCP MP Tay. unavailable.    History of Present Illness  The patient is a 40-year-old male who presents for evaluation of weight management.    He has been under the care of his primary care provider, Jalyn, who prescribed metformin for prediabetes and weight management. However, he reports no significant weight loss with this medication. 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 Despite these challenges, he attempts to adhere to a healthy diet, bringing small meals to work and avoiding high-sodium foods. His diet includes occasional unprocessed foods such as apples or oranges, approximately 2 to 3 times per week. Gives examples of PB and Jelly sandwiches and mac and cheese as other foods he eats. He has previously consulted with a dietitian.     He has a history of gastric bypass surgery, which initially resulted in weight loss, but he has since regained the weight. He does not report any abdominal issues and does not have sleep apnea.  Currently heartburn very well-controlled.    PAST SURGICAL HISTORY:  Gastric bypass surgery    Review of Systems   Constitutional:  Negative for chills, fever, malaise/fatigue and weight loss.   Respiratory:  Negative for cough and shortness of breath.    Cardiovascular:  Negative for chest pain.   Gastrointestinal:  Negative for abdominal pain, constipation, diarrhea, heartburn, nausea and vomiting.   Skin:  Negative for rash.       Objective:     Exam:  /76 (BP Location: Left arm, Patient Position: Sitting, BP Cuff Size: Adult)   Pulse 72   Temp 36.3 °C (97.4 °F) (Temporal)   Resp 16   Ht 1.651 m  "(5' 5\")   Wt 114 kg (251 lb 12.8 oz)   SpO2 94%   BMI 41.90 kg/m²  Body mass index is 41.9 kg/m².    Physical Exam  Vitals reviewed.   Constitutional:       Appearance: Normal appearance. He is morbidly obese.   HENT:      Head: Normocephalic and atraumatic.      Mouth/Throat:      Mouth: Mucous membranes are moist.   Pulmonary:      Effort: Pulmonary effort is normal.   Neurological:      General: No focal deficit present.      Mental Status: He is alert and oriented to person, place, and time.   Psychiatric:         Mood and Affect: Mood normal.         Behavior: Behavior normal.         Thought Content: Thought content normal.       Reviewed most recent/relevant labs.    Assessment & Plan:     40 y.o. male with the following -     1. Morbid obesity with BMI of 40.0-44.9, adult (HCC)  2. Prediabetes  Chronic conditions. He was informed that metformin is beneficial for managing prediabetes but may not be as effective for weight loss.  Discussed other options, tirzepatide being the most effective.  Discussed potential medication side effects.    - A comprehensive discussion was held regarding dietary modifications, emphasizing the importance of a plant-based diet constituting half of his total intake, with the remaining quarter comprising complex carbohydrates. He was also advised to avoid processed foods and sugary beverages. The potential benefits of lean proteins such as white meat, chicken, and fish were discussed.   - A prescription for tirzepatide 2.5 mg was provided, with instructions to increase the dose to 5 mg after one month if well-tolerated. Dose adjustments reviewed.  - He was advised to continue his heartburn medication, especially in light of his upcoming Zepbound treatment.  If this medication covered by insurance he likely can discontinue his metformin.    - A1c should be checked annually.  - Tirzepatide-Weight Management 2.5 MG/0.5ML Solution Auto-injector; Inject 2.5 mg under the skin every 7 " days for 28 days.  Dispense: 2 mL; Refill: 0    Return in about 7 weeks (around 7/16/2025), or if symptoms worsen or fail to improve.    Please note that this dictation was created using voice recognition software. I have made every reasonable attempt to correct obvious errors, but I expect that there are errors of grammar and possibly content that I did not discover before finalizing the note.

## 2025-05-30 ENCOUNTER — PATIENT MESSAGE (OUTPATIENT)
Dept: MEDICAL GROUP | Facility: LAB | Age: 40
End: 2025-05-30
Payer: COMMERCIAL

## 2025-05-30 ENCOUNTER — APPOINTMENT (OUTPATIENT)
Dept: PHYSICAL THERAPY | Facility: REHABILITATION | Age: 40
End: 2025-05-30
Attending: NURSE PRACTITIONER
Payer: COMMERCIAL

## 2025-05-30 DIAGNOSIS — R73.03 PREDIABETES: ICD-10-CM

## 2025-05-30 DIAGNOSIS — E66.01 MORBID OBESITY WITH BMI OF 40.0-44.9, ADULT (HCC): ICD-10-CM

## 2025-06-04 ENCOUNTER — APPOINTMENT (OUTPATIENT)
Dept: PHYSICAL THERAPY | Facility: REHABILITATION | Age: 40
End: 2025-06-04
Attending: NURSE PRACTITIONER
Payer: COMMERCIAL

## 2025-06-04 ENCOUNTER — TELEPHONE (OUTPATIENT)
Dept: MEDICAL GROUP | Facility: LAB | Age: 40
End: 2025-06-04
Payer: COMMERCIAL

## 2025-06-04 RX ORDER — TIRZEPATIDE 2.5 MG/.5ML
2.5 INJECTION, SOLUTION SUBCUTANEOUS
Qty: 2 ML | Refills: 0 | Status: SHIPPED | OUTPATIENT
Start: 2025-06-04 | End: 2025-07-02

## 2025-06-06 ENCOUNTER — APPOINTMENT (OUTPATIENT)
Dept: PHYSICAL THERAPY | Facility: REHABILITATION | Age: 40
End: 2025-06-06
Attending: NURSE PRACTITIONER
Payer: COMMERCIAL

## 2025-07-03 DIAGNOSIS — E66.01 MORBID OBESITY WITH BMI OF 40.0-44.9, ADULT (HCC): ICD-10-CM

## 2025-07-03 DIAGNOSIS — R73.03 PREDIABETES: ICD-10-CM

## 2025-07-03 RX ORDER — TIRZEPATIDE 2.5 MG/.5ML
2.5 INJECTION, SOLUTION SUBCUTANEOUS
Qty: 2 ML | Refills: 0 | OUTPATIENT
Start: 2025-07-03 | End: 2025-07-31

## 2025-07-08 ENCOUNTER — TELEPHONE (OUTPATIENT)
Dept: MEDICAL GROUP | Facility: MEDICAL CENTER | Age: 40
End: 2025-07-08
Payer: COMMERCIAL

## 2025-07-08 DIAGNOSIS — E66.01 MORBID OBESITY WITH BMI OF 40.0-44.9, ADULT (HCC): ICD-10-CM

## 2025-07-08 DIAGNOSIS — R73.03 PREDIABETES: ICD-10-CM

## 2025-07-08 RX ORDER — TIRZEPATIDE 2.5 MG/.5ML
2.5 INJECTION, SOLUTION SUBCUTANEOUS
Qty: 2 ML | Refills: 0 | Status: SHIPPED | OUTPATIENT
Start: 2025-07-08 | End: 2025-07-29

## 2025-07-08 NOTE — TELEPHONE ENCOUNTER
Pt has n/s block, 3 no shows. Pt is going to attempt to establish care with different pcp but has a refill for his mounjaro prescription due.

## 2025-07-08 NOTE — TELEPHONE ENCOUNTER
Pt called in and said they wanted to trevor with provider Jalyn he has N/S block and wanted to see why. Transferred him to [facility].    Thank you

## 2025-07-10 ENCOUNTER — APPOINTMENT (OUTPATIENT)
Dept: RESEARCH | Facility: MEDICAL CENTER | Age: 40
End: 2025-07-10
Payer: COMMERCIAL

## 2025-07-29 ENCOUNTER — OFFICE VISIT (OUTPATIENT)
Dept: MEDICAL GROUP | Facility: PHYSICIAN GROUP | Age: 40
End: 2025-07-29
Payer: COMMERCIAL

## 2025-07-29 VITALS
TEMPERATURE: 97.9 F | BODY MASS INDEX: 41.15 KG/M2 | SYSTOLIC BLOOD PRESSURE: 102 MMHG | OXYGEN SATURATION: 95 % | HEART RATE: 80 BPM | HEIGHT: 65 IN | DIASTOLIC BLOOD PRESSURE: 62 MMHG | WEIGHT: 247 LBS

## 2025-07-29 DIAGNOSIS — E66.01 MORBID OBESITY WITH BMI OF 40.0-44.9, ADULT (HCC): ICD-10-CM

## 2025-07-29 DIAGNOSIS — R73.03 PREDIABETES: ICD-10-CM

## 2025-07-29 DIAGNOSIS — Z23 NEED FOR VACCINATION: ICD-10-CM

## 2025-07-29 DIAGNOSIS — Z76.89 ENCOUNTER TO ESTABLISH CARE: Primary | ICD-10-CM

## 2025-07-29 DIAGNOSIS — M06.9 RHEUMATOID ARTHRITIS INVOLVING MULTIPLE SITES, UNSPECIFIED WHETHER RHEUMATOID FACTOR PRESENT (HCC): ICD-10-CM

## 2025-07-29 DIAGNOSIS — Z79.891 CHRONIC USE OF OPIATE DRUG FOR THERAPEUTIC PURPOSE: ICD-10-CM

## 2025-07-29 DIAGNOSIS — G56.00 CARPAL TUNNEL SYNDROME, UNSPECIFIED LATERALITY: ICD-10-CM

## 2025-07-29 PROBLEM — R53.83 OTHER FATIGUE: Status: RESOLVED | Noted: 2025-02-14 | Resolved: 2025-07-29

## 2025-07-29 PROBLEM — M54.50 ACUTE LEFT-SIDED LOW BACK PAIN WITHOUT SCIATICA: Status: RESOLVED | Noted: 2021-05-06 | Resolved: 2025-07-29

## 2025-07-29 PROBLEM — K21.9 GERD (GASTROESOPHAGEAL REFLUX DISEASE): Status: RESOLVED | Noted: 2023-05-24 | Resolved: 2025-07-29

## 2025-07-29 PROBLEM — M54.6 ACUTE MIDLINE THORACIC BACK PAIN: Status: RESOLVED | Noted: 2025-01-23 | Resolved: 2025-07-29

## 2025-07-29 PROBLEM — R21 RASH OF FOOT: Status: RESOLVED | Noted: 2023-05-24 | Resolved: 2025-07-29

## 2025-07-29 PROBLEM — R53.82 CHRONIC FATIGUE: Status: RESOLVED | Noted: 2021-09-24 | Resolved: 2025-07-29
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
## 2025-07-29 PROBLEM — R40.0 DAYTIME SOMNOLENCE: Status: RESOLVED | Noted: 2022-03-15 | Resolved: 2025-07-29

## 2025-07-29 PROBLEM — Z98.84 HISTORY OF GASTRIC BYPASS: Status: RESOLVED | Noted: 2024-09-20 | Resolved: 2025-07-29

## 2025-07-29 PROBLEM — M54.50 LUMBAR PAIN: Status: RESOLVED | Noted: 2025-01-23 | Resolved: 2025-07-29

## 2025-07-29 RX ORDER — TIRZEPATIDE 5 MG/.5ML
5 INJECTION, SOLUTION SUBCUTANEOUS
Qty: 2 ML | Refills: 1 | Status: SHIPPED | OUTPATIENT
Start: 2025-07-29

## 2025-07-29 SDOH — HEALTH STABILITY: PHYSICAL HEALTH: ON AVERAGE, HOW MANY MINUTES DO YOU ENGAGE IN EXERCISE AT THIS LEVEL?: 60 MIN

## 2025-07-29 SDOH — ECONOMIC STABILITY: FOOD INSECURITY: WITHIN THE PAST 12 MONTHS, THE FOOD YOU BOUGHT JUST DIDN'T LAST AND YOU DIDN'T HAVE MONEY TO GET MORE.: NEVER TRUE

## 2025-07-29 SDOH — ECONOMIC STABILITY: INCOME INSECURITY: IN THE LAST 12 MONTHS, WAS THERE A TIME WHEN YOU WERE NOT ABLE TO PAY THE MORTGAGE OR RENT ON TIME?: NO

## 2025-07-29 SDOH — ECONOMIC STABILITY: HOUSING INSECURITY
IN THE LAST 12 MONTHS, WAS THERE A TIME WHEN YOU DID NOT HAVE A STEADY PLACE TO SLEEP OR SLEPT IN A SHELTER (INCLUDING NOW)?: NO

## 2025-07-29 SDOH — HEALTH STABILITY: PHYSICAL HEALTH: ON AVERAGE, HOW MANY DAYS PER WEEK DO YOU ENGAGE IN MODERATE TO STRENUOUS EXERCISE (LIKE A BRISK WALK)?: 6 DAYS

## 2025-07-29 SDOH — HEALTH STABILITY: MENTAL HEALTH
STRESS IS WHEN SOMEONE FEELS TENSE, NERVOUS, ANXIOUS, OR CAN'T SLEEP AT NIGHT BECAUSE THEIR MIND IS TROUBLED. HOW STRESSED ARE YOU?: NOT AT ALL

## 2025-07-29 SDOH — ECONOMIC STABILITY: INCOME INSECURITY: HOW HARD IS IT FOR YOU TO PAY FOR THE VERY BASICS LIKE FOOD, HOUSING, MEDICAL CARE, AND HEATING?: NOT HARD AT ALL

## 2025-07-29 SDOH — ECONOMIC STABILITY: FOOD INSECURITY: WITHIN THE PAST 12 MONTHS, YOU WORRIED THAT YOUR FOOD WOULD RUN OUT BEFORE YOU GOT MONEY TO BUY MORE.: NEVER TRUE

## 2025-07-29 SDOH — ECONOMIC STABILITY: TRANSPORTATION INSECURITY
IN THE PAST 12 MONTHS, HAS LACK OF RELIABLE TRANSPORTATION KEPT YOU FROM MEDICAL APPOINTMENTS, MEETINGS, WORK OR FROM GETTING THINGS NEEDED FOR DAILY LIVING?: NO

## 2025-07-29 SDOH — ECONOMIC STABILITY: TRANSPORTATION INSECURITY
IN THE PAST 12 MONTHS, HAS LACK OF TRANSPORTATION KEPT YOU FROM MEETINGS, WORK, OR FROM GETTING THINGS NEEDED FOR DAILY LIVING?: NO

## 2025-07-29 SDOH — ECONOMIC STABILITY: TRANSPORTATION INSECURITY
IN THE PAST 12 MONTHS, HAS THE LACK OF TRANSPORTATION KEPT YOU FROM MEDICAL APPOINTMENTS OR FROM GETTING MEDICATIONS?: NO

## 2025-07-29 ASSESSMENT — LIFESTYLE VARIABLES
AUDIT-C TOTAL SCORE: 0
HOW OFTEN DO YOU HAVE A DRINK CONTAINING ALCOHOL: NEVER
HOW MANY STANDARD DRINKS CONTAINING ALCOHOL DO YOU HAVE ON A TYPICAL DAY: PATIENT DOES NOT DRINK
SKIP TO QUESTIONS 9-10: 1
HOW OFTEN DO YOU HAVE SIX OR MORE DRINKS ON ONE OCCASION: NEVER

## 2025-07-29 ASSESSMENT — SOCIAL DETERMINANTS OF HEALTH (SDOH)
HOW OFTEN DO YOU ATTEND CHURCH OR RELIGIOUS SERVICES?: MORE THAN 4 TIMES PER YEAR
DO YOU BELONG TO ANY CLUBS OR ORGANIZATIONS SUCH AS CHURCH GROUPS UNIONS, FRATERNAL OR ATHLETIC GROUPS, OR SCHOOL GROUPS?: NO
HOW OFTEN DO YOU ATTENT MEETINGS OF THE CLUB OR ORGANIZATION YOU BELONG TO?: NEVER
HOW OFTEN DO YOU ATTENT MEETINGS OF THE CLUB OR ORGANIZATION YOU BELONG TO?: NEVER
HOW OFTEN DO YOU HAVE A DRINK CONTAINING ALCOHOL: NEVER
IN THE PAST 12 MONTHS, HAS THE ELECTRIC, GAS, OIL, OR WATER COMPANY THREATENED TO SHUT OFF SERVICE IN YOUR HOME?: NO
DO YOU BELONG TO ANY CLUBS OR ORGANIZATIONS SUCH AS CHURCH GROUPS UNIONS, FRATERNAL OR ATHLETIC GROUPS, OR SCHOOL GROUPS?: NO
HOW OFTEN DO YOU GET TOGETHER WITH FRIENDS OR RELATIVES?: NEVER
HOW MANY DRINKS CONTAINING ALCOHOL DO YOU HAVE ON A TYPICAL DAY WHEN YOU ARE DRINKING: PATIENT DOES NOT DRINK
IN A TYPICAL WEEK, HOW MANY TIMES DO YOU TALK ON THE PHONE WITH FAMILY, FRIENDS, OR NEIGHBORS?: MORE THAN THREE TIMES A WEEK
IN A TYPICAL WEEK, HOW MANY TIMES DO YOU TALK ON THE PHONE WITH FAMILY, FRIENDS, OR NEIGHBORS?: MORE THAN THREE TIMES A WEEK
HOW OFTEN DO YOU HAVE SIX OR MORE DRINKS ON ONE OCCASION: NEVER
WITHIN THE PAST 12 MONTHS, YOU WORRIED THAT YOUR FOOD WOULD RUN OUT BEFORE YOU GOT THE MONEY TO BUY MORE: NEVER TRUE
HOW OFTEN DO YOU ATTEND CHURCH OR RELIGIOUS SERVICES?: MORE THAN 4 TIMES PER YEAR
HOW OFTEN DO YOU GET TOGETHER WITH FRIENDS OR RELATIVES?: NEVER
HOW HARD IS IT FOR YOU TO PAY FOR THE VERY BASICS LIKE FOOD, HOUSING, MEDICAL CARE, AND HEATING?: NOT HARD AT ALL

## 2025-07-29 ASSESSMENT — ENCOUNTER SYMPTOMS
CHILLS: 0
SHORTNESS OF BREATH: 0
PALPITATIONS: 0
FEVER: 0

## 2025-07-29 ASSESSMENT — FIBROSIS 4 INDEX: FIB4 SCORE: 0.4

## 2025-07-29 NOTE — LETTER
[facility] [place]  [address] [address]     July 29, 2025    Patient: Cooper Beatty   YOB: 1985   Date of Visit: 7/29/2025       To Whom It May Concern:    Cooper Beatty was seen and treated in our department on 7/29/2025. He will be back to work on 7/30/2025.    Sincerely,       Heidi Cardenas M.D.

## 2025-07-29 NOTE — PROGRESS NOTES
Subjective:   Verbal consent was acquired by the patient to use [employer] ambient listening note generation during this visit Yes     CC:  The primary encounter diagnosis was Encounter to establish care. Diagnoses of Rheumatoid arthritis involving multiple sites, unspecified whether rheumatoid factor present (HCC), Chronic use of opiate drug for therapeutic purpose, Carpal tunnel syndrome, unspecified laterality, Prediabetes, Morbid obesity with BMI of 40.0-44.9, adult (HCC), and Need for vaccination were also pertinent to this visit.    History of Present Illness  Mr. Beatty is a pleasant 41 yo who presents to establish care.    He has been diagnosed with rheumatoid arthritis and is under the care of a rheumatologist at [facility]. His treatment regimen includes weekly injections of Enbrel 50 mg. He also receives Belbuca and Norco through his pain management provider.    He has a history of carpal tunnel syndrome, for which he has received injections that provide intermittent relief. He does not believe his condition is severe enough to warrant surgery.    He is prediabetic and has been struggling with weight management. Despite maintaining an active lifestyle, he only gets about 4 hours of sleep per night. He is currently on Mounjaro 2.5 mg weekly and metformin 500 mg daily. He was previously on Ozempic, but it was not covered by his insurance. He reports that his rash on the foot has improved.    His chronic fatigue has also improved, which he attributes to recent weight loss. He no longer experiences daytime sleepiness.    He had a positive TB test in 2014 and was treated for latent TB. Subsequent tests have been negative.    Social History:  Occupations: Law enforcement  Alcohol: The patient does not consume alcohol.  Tobacco: The patient does not smoke cigarettes.  Sleep: He reports sleeping only 4 hours per night.    PAST SURGICAL HISTORY:  Two complete hip replacements  Back surgery  Gastric  "bypass  Laminotomy of the back  Hip ablation for severe nerve pain radiating down the leg    FAMILY HISTORY  His mother has high blood pressure and high cholesterol. His maternal grandfather had heart disease. There is no family history of colon cancer, breast cancer, or prostate cancer.      Patient Active Problem List    Diagnosis Date Noted    Prediabetes 06/07/2023    Gastroesophageal reflux disease without esophagitis 09/24/2021    Morbid obesity with BMI of 40.0-44.9, adult ([facility]) 07/19/2018    Chronic use of opiate drug for therapeutic purpose 05/01/2015    Positive QuantiFERON-TB Gold test 05/01/2015    Vitamin D deficiency 05/01/2015    Carpal tunnel syndrome 10/30/2013    Rheumatoid arthritis (HCC) 11/03/2009     Health Maintenance: Completed    ROS:   Review of Systems   Constitutional:  Negative for chills and fever.   Respiratory:  Negative for shortness of breath.    Cardiovascular:  Negative for chest pain and palpitations.         Objective:     Exam: /62 (BP Location: Right arm, Patient Position: Sitting, BP Cuff Size: Adult)   Pulse 80   Temp 36.6 °C (97.9 °F) (Temporal)   Ht 1.651 m (5' 5\")   Wt 112 kg (247 lb)   SpO2 95%  Body mass index is 41.1 kg/m².    Physical Exam  Constitutional:       Appearance: Normal appearance.   Cardiovascular:      Rate and Rhythm: Normal rate and regular rhythm.   Pulmonary:      Effort: Pulmonary effort is normal. No respiratory distress.      Breath sounds: Normal breath sounds. No stridor. No wheezing or rhonchi.   Neurological:      Mental Status: He is alert.               Assessment & Plan:   40 y.o. male with the following -    1. Encounter to establish care (Primary)  Patient presents today to establish care. Chart was reviewed and history was discussed in detail with the patient.    2. Rheumatoid arthritis involving multiple sites, unspecified whether rheumatoid factor present (HCC)  Chronic, stable. Patient is established with rheumatology. He " receives Enbrel 50mg injections weekly.    3. Chronic use of opiate drug for therapeutic purpose  Patient is established with pain management. He is on Belbuca 750 mcg PRN and Norco 5mg daily PRN.    4. Carpal tunnel syndrome, unspecified laterality  Chronic, stable.    5. Prediabetes  Chronic, stable.  Will increase dose of Mounjaro from 2.5 mg to 5 mg weekly.  Patient to continue metformin  mg daily.  - Tirzepatide (MOUNJARO) 5 MG/0.5ML Solution Auto-injector; Inject 5 mg under the skin every 7 days.  Dispense: 2 mL; Refill: 1    6. Morbid obesity with BMI of 40.0-44.9, adult (HCC)  Chronic, uncontrolled.  Healthy lifestyle modifications with 40 minutes of daily physical activity recommended.  Healthy diet recommended.    7. Need for vaccination  - Hepatitis B Vaccine Adult 20+            Return if symptoms worsen or fail to improve.    Please note that this dictation was created using voice recognition software. I have made every reasonable attempt to correct obvious errors, but I expect that there are errors of grammar and possibly content that I did not discover before finalizing the note.

## 2025-08-05 DIAGNOSIS — E66.01 MORBID OBESITY WITH BMI OF 40.0-44.9, ADULT (HCC): ICD-10-CM

## 2025-08-05 DIAGNOSIS — R73.03 PREDIABETES: ICD-10-CM

## 2025-08-05 RX ORDER — METFORMIN HYDROCHLORIDE 500 MG/1
500 TABLET, EXTENDED RELEASE ORAL DAILY
Qty: 90 TABLET | Refills: 1 | Status: SHIPPED | OUTPATIENT
Start: 2025-08-05

## 2025-08-11 DIAGNOSIS — K21.9 GASTROESOPHAGEAL REFLUX DISEASE, UNSPECIFIED WHETHER ESOPHAGITIS PRESENT: ICD-10-CM

## 2025-08-11 RX ORDER — PANTOPRAZOLE SODIUM 40 MG/1
40 TABLET, DELAYED RELEASE ORAL DAILY
Qty: 90 TABLET | Refills: 3 | Status: SHIPPED | OUTPATIENT
Start: 2025-08-11